# Patient Record
Sex: MALE | Race: WHITE | Employment: OTHER | URBAN - METROPOLITAN AREA
[De-identification: names, ages, dates, MRNs, and addresses within clinical notes are randomized per-mention and may not be internally consistent; named-entity substitution may affect disease eponyms.]

---

## 2017-02-06 ENCOUNTER — OFFICE VISIT (OUTPATIENT)
Dept: FAMILY MEDICINE CLINIC | Age: 67
End: 2017-02-06

## 2017-02-06 VITALS
RESPIRATION RATE: 16 BRPM | HEIGHT: 67 IN | HEART RATE: 72 BPM | WEIGHT: 232.13 LBS | DIASTOLIC BLOOD PRESSURE: 70 MMHG | SYSTOLIC BLOOD PRESSURE: 132 MMHG | BODY MASS INDEX: 36.43 KG/M2

## 2017-02-06 DIAGNOSIS — E78.00 PURE HYPERCHOLESTEROLEMIA: ICD-10-CM

## 2017-02-06 DIAGNOSIS — I25.10 CORONARY ARTERY DISEASE INVOLVING NATIVE CORONARY ARTERY OF NATIVE HEART WITHOUT ANGINA PECTORIS: ICD-10-CM

## 2017-02-06 DIAGNOSIS — M15.9 PRIMARY OSTEOARTHRITIS INVOLVING MULTIPLE JOINTS: Primary | ICD-10-CM

## 2017-02-06 DIAGNOSIS — N40.0 BENIGN NON-NODULAR PROSTATIC HYPERPLASIA WITHOUT LOWER URINARY TRACT SYMPTOMS: ICD-10-CM

## 2017-02-06 PROCEDURE — 99213 OFFICE O/P EST LOW 20 MIN: CPT | Performed by: FAMILY MEDICINE

## 2017-02-06 ASSESSMENT — ENCOUNTER SYMPTOMS
EYE PAIN: 0
SORE THROAT: 0
TROUBLE SWALLOWING: 0
BLOOD IN STOOL: 0
COUGH: 0
BACK PAIN: 0
SHORTNESS OF BREATH: 0
ABDOMINAL PAIN: 0
CHEST TIGHTNESS: 0
NAUSEA: 0
CONSTIPATION: 0

## 2017-02-06 ASSESSMENT — PATIENT HEALTH QUESTIONNAIRE - PHQ9
SUM OF ALL RESPONSES TO PHQ9 QUESTIONS 1 & 2: 0
2. FEELING DOWN, DEPRESSED OR HOPELESS: 0
2. FEELING DOWN, DEPRESSED OR HOPELESS: 0
1. LITTLE INTEREST OR PLEASURE IN DOING THINGS: 0
1. LITTLE INTEREST OR PLEASURE IN DOING THINGS: 0
SUM OF ALL RESPONSES TO PHQ9 QUESTIONS 1 & 2: 0
SUM OF ALL RESPONSES TO PHQ QUESTIONS 1-9: 0
SUM OF ALL RESPONSES TO PHQ QUESTIONS 1-9: 0

## 2017-02-08 LAB
ABSOLUTE BASO #: 0.1 K/UL (ref 0–0.1)
ABSOLUTE EOS #: 0.5 K/UL (ref 0.1–0.4)
ABSOLUTE LYMPH #: 1.8 K/UL (ref 0.8–5.2)
ABSOLUTE MONO #: 0.7 K/UL (ref 0.1–0.9)
ABSOLUTE NEUT #: 4.7 K/UL (ref 1.3–9.1)
ANION GAP SERPL CALCULATED.3IONS-SCNC: 11 MMOL/L
BASOPHILS RELATIVE PERCENT: 0.9 % (ref 0–1)
BUN BLDV-MCNC: 17 MG/DL (ref 10–20)
CALCIUM SERPL-MCNC: 9.2 MG/DL (ref 8.7–10.8)
CHLORIDE BLD-SCNC: 104 MMOL/L (ref 95–111)
CO2: 27 MMOL/L (ref 21–32)
CREAT SERPL-MCNC: 1.1 MG/DL (ref 0.5–1.3)
EGFR AFRICAN AMERICAN: 81
EGFR IF NONAFRICAN AMERICAN: 67
EOSINOPHILS RELATIVE PERCENT: 6.8 % (ref 1–4)
GLUCOSE: 103 MG/DL (ref 70–100)
HCT VFR BLD CALC: 48.1 % (ref 41.4–51)
HEMOGLOBIN: 16.5 G/DL (ref 13.8–17)
LYMPHOCYTE %: 22.7 % (ref 16–48)
MCH RBC QN AUTO: 29.5 PG (ref 27–34)
MCHC RBC AUTO-ENTMCNC: 34.3 G/DL (ref 31–36)
MCV RBC AUTO: 86 FL (ref 80–100)
MONOCYTES # BLD: 8.8 % (ref 1–8)
NEUTROPHILS RELATIVE PERCENT: 60.3 % (ref 45–75)
PDW BLD-RTO: 13.1 % (ref 10.8–14.8)
PLATELETS: 140 K/UL (ref 150–450)
POTASSIUM SERPL-SCNC: 3.8 MMOL/L (ref 3.5–5.4)
RBC: 5.59 M/UL (ref 4–5.5)
SODIUM BLD-SCNC: 138 MMOL/L (ref 134–147)
WBC: 7.8 K/UL (ref 3.7–10.8)

## 2017-02-09 LAB — PSA, ULTRASENSITIVE: 0.33 NG/ML

## 2017-02-13 ENCOUNTER — TELEPHONE (OUTPATIENT)
Dept: FAMILY MEDICINE CLINIC | Age: 67
End: 2017-02-13

## 2017-04-28 ENCOUNTER — TELEPHONE (OUTPATIENT)
Dept: FAMILY MEDICINE CLINIC | Age: 67
End: 2017-04-28

## 2017-04-28 ENCOUNTER — OFFICE VISIT (OUTPATIENT)
Dept: FAMILY MEDICINE CLINIC | Age: 67
End: 2017-04-28

## 2017-04-28 VITALS
BODY MASS INDEX: 35.82 KG/M2 | HEART RATE: 60 BPM | WEIGHT: 228.25 LBS | HEIGHT: 67 IN | SYSTOLIC BLOOD PRESSURE: 126 MMHG | DIASTOLIC BLOOD PRESSURE: 66 MMHG | RESPIRATION RATE: 12 BRPM

## 2017-04-28 DIAGNOSIS — H90.3 HEARING LOSS, SENSORINEURAL, HIGH FREQUENCY, BILATERAL: Primary | ICD-10-CM

## 2017-04-28 DIAGNOSIS — H91.93 PROFOUND BILATERAL HEARING LOSS: Primary | ICD-10-CM

## 2017-04-28 PROCEDURE — 99213 OFFICE O/P EST LOW 20 MIN: CPT | Performed by: FAMILY MEDICINE

## 2017-04-28 ASSESSMENT — ENCOUNTER SYMPTOMS
CHEST TIGHTNESS: 0
BLOOD IN STOOL: 0
CONSTIPATION: 0
SHORTNESS OF BREATH: 0
BACK PAIN: 0
NAUSEA: 0
TROUBLE SWALLOWING: 0
SORE THROAT: 0
EYE PAIN: 0
COUGH: 0
ABDOMINAL PAIN: 0

## 2017-05-30 ENCOUNTER — OFFICE VISIT (OUTPATIENT)
Dept: AUDIOLOGY | Age: 67
End: 2017-05-30

## 2017-05-30 DIAGNOSIS — H90.3 SENSORINEURAL HEARING LOSS, BILATERAL: Primary | ICD-10-CM

## 2017-07-10 ENCOUNTER — CARE COORDINATION (OUTPATIENT)
Dept: FAMILY MEDICINE CLINIC | Age: 67
End: 2017-07-10

## 2017-08-14 ENCOUNTER — HOSPITAL ENCOUNTER (OUTPATIENT)
Age: 67
Discharge: HOME | End: 2017-08-14
Payer: MEDICARE

## 2017-08-14 ENCOUNTER — HOSPITAL ENCOUNTER (OUTPATIENT)
Dept: GENERAL RADIOLOGY | Age: 67
Discharge: HOME | End: 2017-08-14
Payer: MEDICARE

## 2017-08-14 DIAGNOSIS — Z01.818 PRE-OP TESTING: ICD-10-CM

## 2017-08-14 LAB
ANION GAP SERPL CALCULATED.3IONS-SCNC: 14 MEQ/L (ref 8–16)
BUN BLDV-MCNC: 13 MG/DL (ref 7–22)
CHLORIDE BLD-SCNC: 104 MEQ/L (ref 98–111)
CO2: 24 MEQ/L (ref 23–33)
CREAT SERPL-MCNC: 1 MG/DL (ref 0.4–1.2)
EKG ATRIAL RATE: 62 BPM
EKG P AXIS: 40 DEGREES
EKG P-R INTERVAL: 144 MS
EKG Q-T INTERVAL: 438 MS
EKG QRS DURATION: 78 MS
EKG QTC CALCULATION (BAZETT): 444 MS
EKG R AXIS: 60 DEGREES
EKG T AXIS: 2 DEGREES
EKG VENTRICULAR RATE: 62 BPM
GFR SERPL CREATININE-BSD FRML MDRD: 75 ML/MIN/1.73M2
GLUCOSE BLD-MCNC: 105 MG/DL (ref 70–108)
POTASSIUM SERPL-SCNC: 4.3 MEQ/L (ref 3.5–5.2)
SODIUM BLD-SCNC: 142 MEQ/L (ref 135–145)

## 2017-08-14 PROCEDURE — 84520 ASSAY OF UREA NITROGEN: CPT

## 2017-08-14 PROCEDURE — 36415 COLL VENOUS BLD VENIPUNCTURE: CPT

## 2017-08-14 PROCEDURE — 71020 XR CHEST STANDARD TWO VW: CPT

## 2017-08-14 PROCEDURE — 82947 ASSAY GLUCOSE BLOOD QUANT: CPT

## 2017-08-14 PROCEDURE — 80051 ELECTROLYTE PANEL: CPT

## 2017-08-14 PROCEDURE — 93005 ELECTROCARDIOGRAM TRACING: CPT

## 2017-08-14 PROCEDURE — 82565 ASSAY OF CREATININE: CPT

## 2017-08-21 ENCOUNTER — OFFICE VISIT (OUTPATIENT)
Dept: FAMILY MEDICINE CLINIC | Age: 67
End: 2017-08-21

## 2017-08-21 VITALS
WEIGHT: 230.38 LBS | BODY MASS INDEX: 36.16 KG/M2 | SYSTOLIC BLOOD PRESSURE: 130 MMHG | HEIGHT: 67 IN | DIASTOLIC BLOOD PRESSURE: 76 MMHG | HEART RATE: 64 BPM | RESPIRATION RATE: 12 BRPM

## 2017-08-21 DIAGNOSIS — H90.3 SENSORINEURAL HEARING LOSS (SNHL) OF BOTH EARS: ICD-10-CM

## 2017-08-21 DIAGNOSIS — J06.9 VIRAL URI: ICD-10-CM

## 2017-08-21 DIAGNOSIS — I25.10 ARTERIOSCLEROTIC HEART DISEASE (ASHD): Primary | ICD-10-CM

## 2017-08-21 DIAGNOSIS — E78.00 PURE HYPERCHOLESTEROLEMIA: ICD-10-CM

## 2017-08-21 PROBLEM — H93.299 AUDITORY DISCRIMINATION IMPAIRMENT: Status: ACTIVE | Noted: 2017-05-09

## 2017-08-21 PROCEDURE — 99213 OFFICE O/P EST LOW 20 MIN: CPT | Performed by: FAMILY MEDICINE

## 2017-08-21 ASSESSMENT — ENCOUNTER SYMPTOMS
EYE PAIN: 0
ORTHOPNEA: 0
BACK PAIN: 0
TROUBLE SWALLOWING: 0
ABDOMINAL PAIN: 0
BLOOD IN STOOL: 0
CHEST TIGHTNESS: 0
NAUSEA: 0
COUGH: 0
CONSTIPATION: 0
SORE THROAT: 0
SHORTNESS OF BREATH: 0

## 2017-09-13 ENCOUNTER — HOSPITAL ENCOUNTER (OUTPATIENT)
Dept: AUDIOLOGY | Age: 67
Discharge: HOME | End: 2017-09-13

## 2017-09-13 PROCEDURE — 9990000010 HC NO CHARGE VISIT: Performed by: AUDIOLOGIST

## 2017-12-21 ENCOUNTER — HOSPITAL ENCOUNTER (EMERGENCY)
Age: 67
Discharge: HOME | End: 2017-12-21
Payer: MEDICARE

## 2017-12-21 VITALS
WEIGHT: 225 LBS | BODY MASS INDEX: 35.31 KG/M2 | RESPIRATION RATE: 18 BRPM | TEMPERATURE: 99.9 F | HEART RATE: 107 BPM | SYSTOLIC BLOOD PRESSURE: 105 MMHG | OXYGEN SATURATION: 95 % | DIASTOLIC BLOOD PRESSURE: 63 MMHG | HEIGHT: 67 IN

## 2017-12-21 DIAGNOSIS — J10.1 INFLUENZA A: Primary | ICD-10-CM

## 2017-12-21 LAB
FLU A ANTIGEN: POSITIVE
FLU B ANTIGEN: NEGATIVE

## 2017-12-21 PROCEDURE — 99213 OFFICE O/P EST LOW 20 MIN: CPT

## 2017-12-21 PROCEDURE — 87804 INFLUENZA ASSAY W/OPTIC: CPT

## 2017-12-21 PROCEDURE — 99213 OFFICE O/P EST LOW 20 MIN: CPT | Performed by: NURSE PRACTITIONER

## 2017-12-21 RX ORDER — OSELTAMIVIR PHOSPHATE 75 MG/1
75 CAPSULE ORAL 2 TIMES DAILY
Qty: 10 CAPSULE | Refills: 0 | Status: SHIPPED | OUTPATIENT
Start: 2017-12-21 | End: 2017-12-26

## 2017-12-21 ASSESSMENT — ENCOUNTER SYMPTOMS
EYE DISCHARGE: 0
NAUSEA: 0
BACK PAIN: 0
VOMITING: 0
ABDOMINAL PAIN: 0
CONSTIPATION: 0
RHINORRHEA: 0
COUGH: 1
WHEEZING: 0
SHORTNESS OF BREATH: 0
STRIDOR: 0
COLOR CHANGE: 0
EYE PAIN: 0
SORE THROAT: 0
DIARRHEA: 0

## 2017-12-21 NOTE — ED PROVIDER NOTES
[id number]  Urgent Care Encounter      CHIEF COMPLAINT       Chief Complaint   Patient presents with    Fever     started today    Sinusitis     dry cough, light headed when first getting up       Nurses Notes reviewed and I agree except as noted in the HPI. HISTORY OF PRESENT ILLNESS   Jamie Lua is a 79 y.o. male who presents With high fever and dry cough starting today. Denies nausea vomiting diarrhea, neck pain or stiffness, sore throat or otalgia. Patient went to his PCP and he was referred here for a flu test.    REVIEW OF SYSTEMS     Review of Systems   Constitutional: Positive for fever. Negative for activity change, appetite change, chills and fatigue. HENT: Positive for congestion. Negative for ear pain, rhinorrhea and sore throat. Eyes: Negative for pain and discharge. Respiratory: Positive for cough. Negative for shortness of breath, wheezing and stridor. Cardiovascular: Negative for chest pain. Gastrointestinal: Negative for abdominal pain, constipation, diarrhea, nausea and vomiting. Genitourinary: Negative for dysuria, flank pain and frequency. Musculoskeletal: Negative for arthralgias, back pain, myalgias and neck pain. Skin: Negative for color change and rash. Allergic/Immunologic: Negative for immunocompromised state. Neurological: Negative for dizziness, weakness and headaches. Psychiatric/Behavioral: Negative. PAST MEDICAL HISTORY         Diagnosis Date    Cancer [facility])     skin    Colon polyps 2013      sheik    due  2018    Coronary artery disease 2016    stent  to lad  and  70% circ    H/O colonoscopy 2003    negative    Hyperlipidemia     Kidney stone 1994    Osteoarthritis        SURGICAL HISTORY     Patient  has a past surgical history that includes Knee arthroscopy (1998; 4/04); Cholecystectomy, laparoscopic (3/11); Rotator cuff repair (2004); Total knee arthroplasty;  Carpal tunnel release; Skin cancer excision; Colonoscopy ( 2013); joint replacement; skin biopsy; Coronary angioplasty with stent (2016  may); and Cochlear implant (Right, 08/2017). CURRENT MEDICATIONS       Previous Medications    ASPIRIN 81 MG TABLET    Take 81 mg by mouth daily    ATORVASTATIN (LIPITOR) 40 MG TABLET    Take 40 mg by mouth nightly     ISOSORBIDE MONONITRATE (IMDUR) 30 MG CR TABLET    Take 30 mg by mouth daily    METOPROLOL (LOPRESSOR) 25 MG TABLET    Take 1 tablet by mouth 2 times daily    NITROGLYCERIN (NITROSTAT) 0.4 MG SL TABLET    Place 1 tablet under the tongue every 5 minutes as needed for Chest pain    TICAGRELOR (BRILINTA) 60 MG TABS TABLET    Take 60 mg by mouth 2 times daily       ALLERGIES     Patient is has No Known Allergies. FAMILY HISTORY     Patient's family history includes Heart Disease in his father; High Blood Pressure in his father. SOCIAL HISTORY     Patient  reports that he has never smoked. He has never used smokeless tobacco. He reports that he drinks alcohol. He reports that he does not use drugs. PHYSICAL EXAM     ED TRIAGE VITALS  BP: 105/63, Temp: 99.9 °F (37.7 °C), Pulse: 107, Resp: 18, SpO2: 95 %  Physical Exam   Constitutional: He appears well-developed and well-nourished. He is cooperative. He appears ill. No distress. HENT:   Right Ear: Hearing, tympanic membrane, external ear and ear canal normal.   Left Ear: Hearing, tympanic membrane, external ear and ear canal normal.   Nose: Nose normal. No mucosal edema or rhinorrhea. Right sinus exhibits no maxillary sinus tenderness. Left sinus exhibits no maxillary sinus tenderness. Mouth/Throat: Uvula is midline and mucous membranes are normal. No posterior oropharyngeal edema or posterior oropharyngeal erythema. Eyes: Conjunctivae and EOM are normal. Pupils are equal, round, and reactive to light. Neck: Normal range of motion and full passive range of motion without pain. Neck supple. Cardiovascular: Regular rhythm.     Pulmonary/Chest: Effort

## 2018-02-13 ENCOUNTER — OFFICE VISIT (OUTPATIENT)
Dept: FAMILY MEDICINE CLINIC | Age: 68
End: 2018-02-13

## 2018-02-13 VITALS
HEIGHT: 67 IN | DIASTOLIC BLOOD PRESSURE: 76 MMHG | WEIGHT: 230.25 LBS | RESPIRATION RATE: 14 BRPM | SYSTOLIC BLOOD PRESSURE: 130 MMHG | HEART RATE: 64 BPM | BODY MASS INDEX: 36.14 KG/M2

## 2018-02-13 DIAGNOSIS — I25.10 CORONARY ARTERY DISEASE INVOLVING NATIVE CORONARY ARTERY OF NATIVE HEART WITHOUT ANGINA PECTORIS: Primary | ICD-10-CM

## 2018-02-13 DIAGNOSIS — H90.3 SENSORINEURAL HEARING LOSS (SNHL) OF BOTH EARS: ICD-10-CM

## 2018-02-13 DIAGNOSIS — E78.00 PURE HYPERCHOLESTEROLEMIA: ICD-10-CM

## 2018-02-13 DIAGNOSIS — M15.9 PRIMARY OSTEOARTHRITIS INVOLVING MULTIPLE JOINTS: ICD-10-CM

## 2018-02-13 DIAGNOSIS — K63.5 POLYP OF COLON, UNSPECIFIED PART OF COLON, UNSPECIFIED TYPE: ICD-10-CM

## 2018-02-13 PROCEDURE — 99213 OFFICE O/P EST LOW 20 MIN: CPT | Performed by: FAMILY MEDICINE

## 2018-02-13 ASSESSMENT — ENCOUNTER SYMPTOMS
ABDOMINAL PAIN: 0
BACK PAIN: 0
SORE THROAT: 0
TROUBLE SWALLOWING: 0
NAUSEA: 0
BLOOD IN STOOL: 0
SHORTNESS OF BREATH: 0
CHEST TIGHTNESS: 0
COUGH: 0
EYE PAIN: 0
CONSTIPATION: 0

## 2018-02-13 ASSESSMENT — PATIENT HEALTH QUESTIONNAIRE - PHQ9
SUM OF ALL RESPONSES TO PHQ QUESTIONS 1-9: 0
2. FEELING DOWN, DEPRESSED OR HOPELESS: 0
SUM OF ALL RESPONSES TO PHQ9 QUESTIONS 1 & 2: 0
1. LITTLE INTEREST OR PLEASURE IN DOING THINGS: 0

## 2018-09-24 ENCOUNTER — TELEPHONE (OUTPATIENT)
Dept: FAMILY MEDICINE CLINIC | Age: 68
End: 2018-09-24

## 2018-09-24 ENCOUNTER — HOSPITAL ENCOUNTER (EMERGENCY)
Age: 68
Discharge: HOME | End: 2018-09-24
Attending: EMERGENCY MEDICINE
Payer: MEDICARE

## 2018-09-24 VITALS
WEIGHT: 230 LBS | SYSTOLIC BLOOD PRESSURE: 128 MMHG | RESPIRATION RATE: 16 BRPM | DIASTOLIC BLOOD PRESSURE: 95 MMHG | OXYGEN SATURATION: 93 % | HEIGHT: 67 IN | TEMPERATURE: 98.4 F | BODY MASS INDEX: 36.1 KG/M2 | HEART RATE: 102 BPM

## 2018-09-24 DIAGNOSIS — I49.9 SINUS NODE ARRHYTHMIA: Primary | ICD-10-CM

## 2018-09-24 DIAGNOSIS — Z95.5 HISTORY OF PLACEMENT OF STENT IN LAD CORONARY ARTERY: ICD-10-CM

## 2018-09-24 LAB
ALBUMIN SERPL-MCNC: 4.1 G/DL (ref 3.5–5.1)
ALP BLD-CCNC: 77 U/L (ref 38–126)
ALT SERPL-CCNC: 25 U/L (ref 11–66)
ANION GAP SERPL CALCULATED.3IONS-SCNC: 10 MEQ/L (ref 8–16)
AST SERPL-CCNC: 33 U/L (ref 5–40)
BASOPHILS # BLD: 0.7 %
BASOPHILS ABSOLUTE: 0.1 THOU/MM3 (ref 0–0.1)
BILIRUB SERPL-MCNC: 2.3 MG/DL (ref 0.3–1.2)
BILIRUBIN DIRECT: 0.4 MG/DL (ref 0–0.3)
BUN BLDV-MCNC: 21 MG/DL (ref 7–22)
CALCIUM SERPL-MCNC: 9.1 MG/DL (ref 8.5–10.5)
CHLORIDE BLD-SCNC: 106 MEQ/L (ref 98–111)
CHOLESTEROL, TOTAL: 122 MG/DL (ref 100–199)
CO2: 25 MEQ/L (ref 23–33)
CREAT SERPL-MCNC: 1.1 MG/DL (ref 0.4–1.2)
EKG ATRIAL RATE: 83 BPM
EKG ATRIAL RATE: 97 BPM
EKG P AXIS: 45 DEGREES
EKG P AXIS: 47 DEGREES
EKG P-R INTERVAL: 122 MS
EKG P-R INTERVAL: 150 MS
EKG Q-T INTERVAL: 358 MS
EKG Q-T INTERVAL: 360 MS
EKG QRS DURATION: 72 MS
EKG QRS DURATION: 74 MS
EKG QTC CALCULATION (BAZETT): 423 MS
EKG QTC CALCULATION (BAZETT): 454 MS
EKG R AXIS: 46 DEGREES
EKG R AXIS: 53 DEGREES
EKG T AXIS: 18 DEGREES
EKG T AXIS: 24 DEGREES
EKG VENTRICULAR RATE: 83 BPM
EKG VENTRICULAR RATE: 97 BPM
EOSINOPHIL # BLD: 4.1 %
EOSINOPHILS ABSOLUTE: 0.3 THOU/MM3 (ref 0–0.4)
ERYTHROCYTE [DISTWIDTH] IN BLOOD BY AUTOMATED COUNT: 12.7 % (ref 11.5–14.5)
ERYTHROCYTE [DISTWIDTH] IN BLOOD BY AUTOMATED COUNT: 40.3 FL (ref 35–45)
GFR SERPL CREATININE-BSD FRML MDRD: 67 ML/MIN/1.73M2
GLUCOSE BLD-MCNC: 119 MG/DL (ref 70–108)
HCT VFR BLD CALC: 50.1 % (ref 42–52)
HDLC SERPL-MCNC: 53 MG/DL
HEMOGLOBIN: 17.9 GM/DL (ref 14–18)
IMMATURE GRANS (ABS): 0.04 THOU/MM3 (ref 0–0.07)
IMMATURE GRANULOCYTES: 0.5 %
LDL CHOLESTEROL CALCULATED: 30 MG/DL
LYMPHOCYTES # BLD: 21.2 %
LYMPHOCYTES ABSOLUTE: 1.6 THOU/MM3 (ref 1–4.8)
MAGNESIUM: 2.2 MG/DL (ref 1.6–2.4)
MCH RBC QN AUTO: 31.3 PG (ref 26–33)
MCHC RBC AUTO-ENTMCNC: 35.7 GM/DL (ref 32.2–35.5)
MCV RBC AUTO: 87.6 FL (ref 80–94)
MONOCYTES # BLD: 7.4 %
MONOCYTES ABSOLUTE: 0.6 THOU/MM3 (ref 0.4–1.3)
NUCLEATED RED BLOOD CELLS: 0 /100 WBC
OSMOLALITY CALCULATION: 285.4 MOSMOL/KG (ref 275–300)
PLATELET # BLD: 149 THOU/MM3 (ref 130–400)
PMV BLD AUTO: 10.7 FL (ref 9.4–12.4)
POTASSIUM SERPL-SCNC: 4.8 MEQ/L (ref 3.5–5.2)
RBC # BLD: 5.72 MILL/MM3 (ref 4.7–6.1)
SEG NEUTROPHILS: 66.1 %
SEGMENTED NEUTROPHILS ABSOLUTE COUNT: 5 THOU/MM3 (ref 1.8–7.7)
SODIUM BLD-SCNC: 141 MEQ/L (ref 135–145)
TOTAL PROTEIN: 7 G/DL (ref 6.1–8)
TRIGL SERPL-MCNC: 193 MG/DL (ref 0–199)
TROPONIN T: < 0.01 NG/ML
TSH SERPL DL<=0.05 MIU/L-ACNC: 2.75 UIU/ML (ref 0.4–4.2)
WBC # BLD: 7.5 THOU/MM3 (ref 4.8–10.8)

## 2018-09-24 PROCEDURE — 93010 ELECTROCARDIOGRAM REPORT: CPT | Performed by: NUCLEAR MEDICINE

## 2018-09-24 PROCEDURE — 85025 COMPLETE CBC W/AUTO DIFF WBC: CPT

## 2018-09-24 PROCEDURE — 93226 XTRNL ECG REC<48 HR SCAN A/R: CPT

## 2018-09-24 PROCEDURE — 36415 COLL VENOUS BLD VENIPUNCTURE: CPT

## 2018-09-24 PROCEDURE — 80061 LIPID PANEL: CPT

## 2018-09-24 PROCEDURE — 84484 ASSAY OF TROPONIN QUANT: CPT

## 2018-09-24 PROCEDURE — 84443 ASSAY THYROID STIM HORMONE: CPT

## 2018-09-24 PROCEDURE — 93225 XTRNL ECG REC<48 HRS REC: CPT

## 2018-09-24 PROCEDURE — 83735 ASSAY OF MAGNESIUM: CPT

## 2018-09-24 PROCEDURE — 80053 COMPREHEN METABOLIC PANEL: CPT

## 2018-09-24 PROCEDURE — 99284 EMERGENCY DEPT VISIT MOD MDM: CPT

## 2018-09-24 PROCEDURE — 82248 BILIRUBIN DIRECT: CPT

## 2018-09-24 PROCEDURE — 93005 ELECTROCARDIOGRAM TRACING: CPT | Performed by: EMERGENCY MEDICINE

## 2018-09-24 ASSESSMENT — ENCOUNTER SYMPTOMS
BACK PAIN: 0
NAUSEA: 0
EYE DISCHARGE: 0
EYE REDNESS: 0
SHORTNESS OF BREATH: 0
SORE THROAT: 0
RHINORRHEA: 0
WHEEZING: 0
ABDOMINAL PAIN: 0
DIARRHEA: 0
COUGH: 0
VOMITING: 0

## 2018-09-24 NOTE — ED PROVIDER NOTES
encounter: 230 lb (104.3 kg). Physical Exam   Constitutional: He is oriented to person, place, and time. He appears well-developed and well-nourished. Non-toxic appearance. HENT:   Head: Normocephalic and atraumatic. Right Ear: Tympanic membrane and external ear normal.   Left Ear: Tympanic membrane and external ear normal.   Nose: Nose normal.   Mouth/Throat: Oropharynx is clear and moist and mucous membranes are normal. No oropharyngeal exudate, posterior oropharyngeal edema or posterior oropharyngeal erythema. Eyes: Conjunctivae and EOM are normal.   Neck: Normal range of motion. Neck supple. No JVD present. Cardiovascular: Regular rhythm, normal heart sounds, intact distal pulses and normal pulses. Exam reveals no gallop and no friction rub. No murmur heard. The patient's pulse went into and out of normal sinus rhythm. Pulmonary/Chest: Effort normal and breath sounds normal. No respiratory distress. He has no decreased breath sounds. He has no wheezes. He has no rhonchi. He has no rales. Abdominal: Soft. Bowel sounds are normal. He exhibits no distension. There is no tenderness. There is no rebound, no guarding and no CVA tenderness. Musculoskeletal: Normal range of motion. He exhibits no edema. Neurological: He is alert and oriented to person, place, and time. He exhibits normal muscle tone. Coordination normal.   Skin: Skin is warm and dry. No rash noted. He is not diaphoretic. Nursing note and vitals reviewed.       MEDICAL DECISION MAKING    DIFFERENTIAL DIAGNOSIS:  Atrial fibrillation, atrial flutter, cardiac dysrhythmia, metabolic abnormality, electrolyte abnormality, thyroid dysfunction      DIAGNOSTIC RESULTS    EKG   Interpreted by Ortega Arriola MD   Rhythm: sinus rhythm with marked sinus arrhythmia, No STEMI   Rate: normal  Axis: normal  Ectopy: none  Conduction: normal  ST Segments: no acute change  T Waves: no acute change  Q Waves: none    Clinical Impression: sinus rhythm with marked sinus arrhythmia, No STEMI    Repeat EKG  Interpreted by Finn Zazueta MD    Rhythm: sinus rhythm with marked sinus arrhythmia, No STEMI   Rate: normal  Axis: normal  Ectopy: none  Conduction: normal  ST Segments: no acute change  T Waves: no acute change  Q Waves: none    Clinical Impression: sinus rhythm with marked sinus arrhythmia, No STEMI    LABS:   Labs Reviewed   CBC WITH AUTO DIFFERENTIAL - Abnormal; Notable for the following:        Result Value    MCHC 35.7 (*)     All other components within normal limits   BASIC METABOLIC PANEL - Abnormal; Notable for the following:     Glucose 119 (*)     All other components within normal limits   HEPATIC FUNCTION PANEL - Abnormal; Notable for the following: Total Bilirubin 2.3 (*)     Bilirubin, Direct 0.4 (*)     All other components within normal limits   GLOMERULAR FILTRATION RATE, ESTIMATED - Abnormal; Notable for the following:     Est, Glom Filt Rate 67 (*)     All other components within normal limits   TROPONIN   MAGNESIUM   TSH WITH REFLEX   LIPID PANEL   ANION GAP   OSMOLALITY     All other unresulted laboratory test above are normal:    Vitals:    Vitals:    09/24/18 1338 09/24/18 1416 09/24/18 1517   BP: (!) 155/78 128/81 (!) 128/95   Pulse: 97 100 102   Resp: 14 16 16   Temp: 98.4 °F (36.9 °C)     TempSrc: Oral     SpO2: 95% 94% 93%   Weight: 230 lb (104.3 kg)     Height: 5' 7\" (1.702 m)         EMERGENCY DEPARTMENT COURSE:    Medications - No data to display    The pt was seen and evaluated by me. Within the department, I observed the pt's vital signs to be within acceptable range. Laboratory and Radiological studies were performed, results were reviewed with the patient. I observed the pt's condition to remain stable during the duration of their stay. I decided to place the patient on a holter monitor for the next 48 hours. I explained my proposed course of treatment to the pt, and they were amenable to my decision.  They were discharged home, and they will return to the ED if their symptoms become more severe in nature, or otherwise change. CRITICAL CARE:   None. CONSULTS:  None    PROCEDURES:  None. FINAL IMPRESSION       1. Sinus node arrhythmia    2. History of placement of stent in LAD coronary artery          DISPOSITION/PLAN  PATIENT REFERRED TO:  MD Valentin Gongora  [address] [facility]    In 1 week      Esvin Fermin MD  [address]  [phone]    In 5 days      DISCHARGE MEDICATIONS:  Discharge Medication List as of 9/24/2018  3:50 PM          (Please note that portions of this note were completed with a voice recognition program.  Efforts were made to edit the dictations but occasionally words are mis-transcribed.)      Scribe:  Ting Castro 10/01/18 3:09 PM Scribing for and in the presence of Micaela Aguirre M.D. Signed by: Philippe Killian, 10/01/18 10:50 AM    Provider:  I personally performed the services described in the documentation, reviewed and edited the documentation which was dictated to the scribe in my presence, and it accurately records my words and actions.      10/01/18 10:50 AM      Oliver Tucker MD      Emergency room physician            Oliver Tucker MD  10/01/18 [id number]

## 2018-10-17 ENCOUNTER — TELEPHONE (OUTPATIENT)
Dept: FAMILY MEDICINE CLINIC | Age: 68
End: 2018-10-17

## 2018-10-25 ENCOUNTER — TELEPHONE (OUTPATIENT)
Dept: FAMILY MEDICINE CLINIC | Age: 68
End: 2018-10-25

## 2018-10-25 NOTE — TELEPHONE ENCOUNTER
----- Message from Nupur De La Rosa MD sent at 10/25/2018  5:39 AM EDT -----  Bilirubin up as in past and normal liver  functioins and  feel oumarbert .   Need his old chart pulled

## 2018-10-26 NOTE — TELEPHONE ENCOUNTER
I called Dr Tomas Webb ofc and they said that at the October 2, 2018 visit Dr Ju Oakes discontinued the Imdur and started Cartia 120 mg  I po qd. He was also to continue his ASA and Brilinta.

## 2018-10-29 RX ORDER — DILTIAZEM HYDROCHLORIDE 120 MG/1
120 CAPSULE, COATED, EXTENDED RELEASE ORAL DAILY
Qty: 30 CAPSULE | Refills: 3
Start: 2018-10-29 | End: 2022-09-28 | Stop reason: SDUPTHER

## 2018-12-07 NOTE — PROGRESS NOTES
[facility]                                                                                      Sleep Medicine Initial Consultation    [address] [place]                                                Chief complaint: [address] [place] is a 76 y. o.oldmale came for further evaluation regarding his ?sleep apnea  with referral from Dr. Georgiana Mckee.    Chief Complaint: Daniel Peoples is here for a sleep consult referred by Anthony Nielsen:    Sleep/Wake schedule:  Usual time to go to bed during the work/regular day of week: 10:00 to 11:00 PM.  Usual time to wake up during the work//regular day of week: 7:00 to 8:00 AM.  Over the weekends his sleep schedule: [x] Remain same. He usually falls a sleep in less than: 10 minutes. He takes naps: No.    Sleep Hygiene:  Is the temperature and evironment in his bed room is acceptable to him: Yes. He watches Television in his bed room: No.  He read books, study, pay bills etc in the bed: No.  Frequency He wake up during night/sleep: 1 to 2  Majority of nocturnal awakenings are for urination: No.    Difficulty in falling back to sleep after nocturnal awakenings: No  .  Do you drink coffee:   [x] Yes. 1 cup per week. Do you drink caffeinated beverages i.e sodas: [x] Yes. 1 can/s per week. Do you drink tea:  [x] Yes. 2 to 3 cup/s/glasse/s of decaffeinated tea per day. Do you drink alcoholic beverages:   [x] Yes. 1 drink per day. History of recreational drug use: No.     Sleep apnea symptoms:  Noticed to have loud snoring:Yes by his wife only when he lays down on his back and alcoholic drink. Witnessed apneas during sleep noticed: No.   History of choking and gasping sensation at night time: No.  History of headaches in the morning:No.  History of dry mouth in the morning: Yes.   History of palpitations during night time/nocturnal awakenings: No.  History of sweating during night time/nocturnal 08/2017    at [facility]  dr Sole Aragon    COLONOSCOPY   2013      sheik   colon polyps    CORONARY ANGIOPLASTY WITH STENT PLACEMENT  2016  may    stent  Lda  steph    JOINT REPLACEMENT      bilateral knees    KNEE ARTHROSCOPY  1998; 4/04    left:  Right 9/04    ROTATOR CUFF REPAIR  2004    right/ left     SKIN BIOPSY      SKIN CANCER EXCISION      left AC    TOTAL KNEE ARTHROPLASTY      left 10/06; right 6/08       No Known Allergies    Current Outpatient Prescriptions   Medication Sig Dispense Refill    diltiazem (CARDIZEM CD) 120 MG extended release capsule Take 1 capsule by mouth daily 30 capsule 3    ticagrelor (BRILINTA) 60 MG TABS tablet Take 60 mg by mouth 2 times daily      aspirin 81 MG tablet Take 81 mg by mouth daily      atorvastatin (LIPITOR) 40 MG tablet Take 40 mg by mouth nightly       nitroGLYCERIN (NITROSTAT) 0.4 MG SL tablet Place 1 tablet under the tongue every 5 minutes as needed for Chest pain 25 tablet 3    metoprolol (LOPRESSOR) 25 MG tablet Take 1 tablet by mouth 2 times daily 60 tablet 3     No current facility-administered medications for this visit. Family History   Problem Relation Age of Onset    Heart Disease Father     High Blood Pressure Father         Review of Systems:   General/Constitutional: No recent loss of weight or appetite changes. No fever or chills. HENT: Decreased hearing sensation. S/p Cochlear implant on right side. Eyes: Negative. Upper respiratory tract: No nasal stuffiness or post nasal drip. Lower respiratory tract/ lungs: No cough or sputum production. No hemoptysis. Cardiovascular: No palpitations or chest pain. Gastrointestinal: No nausea or vomiting. Neurological: No focal neurologiacal weakness. Extremities: No edema. Musculoskeletal: No complaints. Genitourinary: No complaints. Hematological: Negative. Psychiatric/Behavioral: Negative. Skin: No itching.     /73   Pulse 71   Ht 5' 7\" (1.702 m)   Wt 232 lb 3.2 oz (105.3 MD Cuco.  -Hyperlipidemia      Recommendations/Plan:  -Please obtain latest Echocardiogram report from Dr. Remigio Simons office for review.  -Will schedule patient for polysomnogram in the sleep lab. -I had a discussion with patient regarding avialable treatment options for his sleep disorder breathing including but not limited to CPAP titration in the sleep lab Vs.Dental appliance placement with referral to a local dentist Vs other available surgical options including Uvulopalatopharyngoplasty, maxillomandibular ostomy and tracheostomy as last option. At the end of discussion, he is not decided on his   treatment if he found to have obstructive sleep apnea at this time.  -We will see Jose Antonio Byrne back in 1week after the sleep study to go over the sleep study results and further management options.  -He was educated to practice good sleep hygiene practices. He  was provided with a good sleep hygiene hand out.  -Angel Deal was advised to make earlier appointment with my clinic if he develops any worsening of sleep symptoms. He verbalizes understanding.  -He was advised to loose weight by controlling diet and doing exercise once cleared by his cardiologist Dr. Shellie Hoffman was educated about my impression and plan. He verbalizes understanding.

## 2018-12-10 ENCOUNTER — HOSPITAL ENCOUNTER (OUTPATIENT)
Dept: AUDIOLOGY | Age: 68
Discharge: HOME | End: 2018-12-10

## 2018-12-10 PROCEDURE — 9990000010 HC NO CHARGE VISIT: Performed by: AUDIOLOGIST

## 2018-12-10 NOTE — PROGRESS NOTES
Patient wants his left [facility] hearing aid cleaned and checked. Replaced wax filter, hearing aid is working fine. Patient has a cochlear implant in his right ear which was implanted through [address] [place] [address]. He is doing well with it. No other problems.

## 2018-12-12 ENCOUNTER — INITIAL CONSULT (OUTPATIENT)
Dept: PULMONOLOGY | Age: 68
End: 2018-12-12
Payer: MEDICARE

## 2018-12-12 VITALS
HEART RATE: 71 BPM | SYSTOLIC BLOOD PRESSURE: 104 MMHG | HEIGHT: 67 IN | WEIGHT: 232.2 LBS | OXYGEN SATURATION: 96 % | DIASTOLIC BLOOD PRESSURE: 73 MMHG | BODY MASS INDEX: 36.44 KG/M2

## 2018-12-12 DIAGNOSIS — G47.30 SLEEP APNEA, UNSPECIFIED TYPE: Primary | ICD-10-CM

## 2018-12-12 DIAGNOSIS — R06.83 SNORING: ICD-10-CM

## 2018-12-12 DIAGNOSIS — I48.0 PAROXYSMAL A-FIB (HCC): ICD-10-CM

## 2018-12-12 DIAGNOSIS — I25.10 CORONARY ARTERY DISEASE INVOLVING NATIVE CORONARY ARTERY OF NATIVE HEART WITHOUT ANGINA PECTORIS: ICD-10-CM

## 2018-12-12 PROCEDURE — 99203 OFFICE O/P NEW LOW 30 MIN: CPT | Performed by: INTERNAL MEDICINE

## 2018-12-31 ENCOUNTER — HOSPITAL ENCOUNTER (EMERGENCY)
Dept: GENERAL RADIOLOGY | Age: 68
Discharge: HOME | End: 2018-12-31
Payer: MEDICARE

## 2018-12-31 ENCOUNTER — HOSPITAL ENCOUNTER (EMERGENCY)
Age: 68
Discharge: HOME | End: 2018-12-31
Payer: MEDICARE

## 2018-12-31 VITALS
WEIGHT: 225 LBS | OXYGEN SATURATION: 95 % | HEART RATE: 70 BPM | TEMPERATURE: 98 F | BODY MASS INDEX: 36.16 KG/M2 | HEIGHT: 66 IN | SYSTOLIC BLOOD PRESSURE: 135 MMHG | RESPIRATION RATE: 16 BRPM | DIASTOLIC BLOOD PRESSURE: 82 MMHG

## 2018-12-31 DIAGNOSIS — J20.8 ACUTE VIRAL BRONCHITIS: Primary | ICD-10-CM

## 2018-12-31 PROCEDURE — 99214 OFFICE O/P EST MOD 30 MIN: CPT | Performed by: NURSE PRACTITIONER

## 2018-12-31 PROCEDURE — 99213 OFFICE O/P EST LOW 20 MIN: CPT

## 2018-12-31 PROCEDURE — 71046 X-RAY EXAM CHEST 2 VIEWS: CPT

## 2018-12-31 RX ORDER — PREDNISONE 20 MG/1
20 TABLET ORAL DAILY
Qty: 5 TABLET | Refills: 0 | Status: SHIPPED | OUTPATIENT
Start: 2018-12-31 | End: 2019-01-05

## 2019-01-23 ENCOUNTER — HOSPITAL ENCOUNTER (OUTPATIENT)
Dept: SLEEP CENTER | Age: 69
Discharge: HOME | End: 2019-01-25
Payer: MEDICARE

## 2019-01-23 DIAGNOSIS — R06.83 SNORING: ICD-10-CM

## 2019-01-23 DIAGNOSIS — I25.10 CORONARY ARTERY DISEASE INVOLVING NATIVE CORONARY ARTERY OF NATIVE HEART WITHOUT ANGINA PECTORIS: ICD-10-CM

## 2019-01-23 DIAGNOSIS — G47.30 SLEEP APNEA, UNSPECIFIED TYPE: ICD-10-CM

## 2019-01-23 DIAGNOSIS — I48.0 PAROXYSMAL A-FIB (HCC): ICD-10-CM

## 2019-01-23 PROCEDURE — 95810 POLYSOM 6/> YRS 4/> PARAM: CPT

## 2019-01-24 LAB — STATUS: NORMAL

## 2019-02-14 ENCOUNTER — OFFICE VISIT (OUTPATIENT)
Dept: PULMONOLOGY | Age: 69
End: 2019-02-14
Payer: MEDICARE

## 2019-02-14 VITALS
OXYGEN SATURATION: 98 % | SYSTOLIC BLOOD PRESSURE: 112 MMHG | HEIGHT: 66 IN | BODY MASS INDEX: 37.19 KG/M2 | WEIGHT: 231.4 LBS | HEART RATE: 74 BPM | DIASTOLIC BLOOD PRESSURE: 80 MMHG

## 2019-02-14 DIAGNOSIS — G47.33 OSA (OBSTRUCTIVE SLEEP APNEA): Primary | ICD-10-CM

## 2019-02-14 DIAGNOSIS — I25.10 CORONARY ARTERY DISEASE INVOLVING NATIVE CORONARY ARTERY OF NATIVE HEART WITHOUT ANGINA PECTORIS: ICD-10-CM

## 2019-02-14 DIAGNOSIS — I48.0 PAF (PAROXYSMAL ATRIAL FIBRILLATION) (HCC): ICD-10-CM

## 2019-02-14 PROCEDURE — 99214 OFFICE O/P EST MOD 30 MIN: CPT | Performed by: NURSE PRACTITIONER

## 2019-03-26 ENCOUNTER — HOSPITAL ENCOUNTER (OUTPATIENT)
Dept: SLEEP CENTER | Age: 69
Discharge: HOME | End: 2019-03-28
Payer: MEDICARE

## 2019-03-26 DIAGNOSIS — G47.33 OSA (OBSTRUCTIVE SLEEP APNEA): ICD-10-CM

## 2019-03-26 PROCEDURE — 95811 POLYSOM 6/>YRS CPAP 4/> PARM: CPT

## 2019-03-27 DIAGNOSIS — Z99.89 OSA ON CPAP: Primary | ICD-10-CM

## 2019-03-27 DIAGNOSIS — G47.33 OSA ON CPAP: Primary | ICD-10-CM

## 2019-03-27 LAB — STATUS: NORMAL

## 2019-03-28 ENCOUNTER — TELEPHONE (OUTPATIENT)
Dept: SLEEP CENTER | Age: 69
End: 2019-03-28

## 2019-06-11 ENCOUNTER — OFFICE VISIT (OUTPATIENT)
Dept: PULMONOLOGY | Age: 69
End: 2019-06-11
Payer: MEDICARE

## 2019-06-11 VITALS
DIASTOLIC BLOOD PRESSURE: 78 MMHG | WEIGHT: 225.6 LBS | OXYGEN SATURATION: 95 % | SYSTOLIC BLOOD PRESSURE: 122 MMHG | HEART RATE: 82 BPM | HEIGHT: 67 IN | BODY MASS INDEX: 35.41 KG/M2

## 2019-06-11 DIAGNOSIS — Z99.89 OSA ON CPAP: Primary | ICD-10-CM

## 2019-06-11 DIAGNOSIS — I48.0 PAROXYSMAL ATRIAL FIBRILLATION (HCC): ICD-10-CM

## 2019-06-11 DIAGNOSIS — G47.33 OSA ON CPAP: Primary | ICD-10-CM

## 2019-06-11 PROCEDURE — 99213 OFFICE O/P EST LOW 20 MIN: CPT | Performed by: NURSE PRACTITIONER

## 2019-06-11 NOTE — PROGRESS NOTES
[facility]         [id number]  6/11/2019   Chief Complaint   Patient presents with    Sleep Apnea     4-6 week f/u with download         Pt of Dr. Ian Kennedy    PAP Download:   Stephenie Enter or initial AHI: 11.6     Date of initial study: 1/23/19  Weight of initial study: 232   [x] Compliant  100%   []  Noncompliant 0%     PAP Type cpapLevel  8   Avg Hrs/Day [id number]  AHI: 4.8   Recorded compliance dates, 5/10/19  to 6/8/19   Machine/Mfg: Respironics Interface: nasal     Provider:  [][facility]  [][employer]  [x][employer]  [][facility]         [][facility] []Other:     Neck Size:18  Mallampati 3  ESS: 6  SAQLI:84     Here is a scan of the most recent download:                  Presentation:   Anneliese Oscar presents for sleep medicine follow up for obstructive sleep apnea. Since the last visit, Anenliese Oscar has underwent CPAP titration study and started PAP therapy and is doing well. He does note benefit with treatment. No recurrence of Afib. Does have irritation at bridge of his nose from nasal mask. Is using home remedies including cotton ball with tape. Has not contacted [employer]. Equipment issues: The pressure is  acceptable, the mask is acceptable and he  is  using the humidity. Sleep issues:  Do you feel better? Yes  More rested? Yes   Better concentration? yes    Progress History:   Since last visit any new medical issues? No  New ER or hospitlal visits? No  Any new or changes in medicines? No  Any new sleep medicines?  No      Past Medical History:   Diagnosis Date    Cancer ([facility] [address]     skin    Colon polyps 2013      sheik    due  2018    Coronary artery disease 2016    stent  to lad  and  70% circ    H/O colonoscopy 2003    negative    Hyperlipidemia     Kidney stone 1994    Loss of hearing     FABY (obstructive sleep apnea)     Osteoarthritis     PAF (paroxysmal atrial fibrillation) (HCC)        Past Surgical History:   Procedure Laterality Date    CARPAL TUNNEL RELEASE      left     CHOLECYSTECTOMY, Skin: No itching. Physical Exam:    BMI:  Body mass index is 35.33 kg/m². Wt Readings from Last 3 Encounters:   06/11/19 225 lb 9.6 oz (102.3 kg)   02/14/19 231 lb 6.4 oz (105 kg)   12/31/18 225 lb (102.1 kg)     Weight stable / unchanged  Vitals: /78   Pulse 82   Ht 5' 7\" (1.702 m)   Wt 225 lb 9.6 oz (102.3 kg)   SpO2 95% Comment: room air at rest  BMI 35.33 kg/m²         General Appearance Moderately built, moderately nourished, in no acute distress. HEENT - Normal, Head is normocephalic, atraumatic. PERRL. Oral mucosa pink and moist, no oral thrush. Mallampati Score - III (soft palate, base of uvula visible). Neck - Supple, symmetrical, trachea midline and soft. Lungs - Chest symmetric with normal A/P diameter, normal respiratory rate and rhythm, lungs clear to auscultation, no wheezes, rales or rhonchi, aeration good. Cardiovascular - Heart sounds are normal. Regular rhythm normal rate without murmur, gallop or rub. Abdomen - Soft, nontender, non-distended. Neurologic - Alert and oriented x 3. Skin - No bruising or bleeding. Extremities - No cyanosis, clubbing or edema. ASSESSMENT/DIAGNOSIS     Diagnosis Orders   1. FABY on CPAP     2. Paroxysmal atrial fibrillation ([address]              Plan   Do you need any equipment today? No. I did show them different styles of CPAP mask barriers on line. They are also going to stop at [facility]. To call with any further issues or concerns. - He  was advised to continue current positive airway pressure therapy with above described pressure. - He  advised to keep goodcompliance with current recommended pressure to get optimal results and clinical improvement.  - Recommend 7-9 hours of sleep with PAP treatment. - He was advised to call [employer] regarding supplies if needed.   -He call my office for earlier appointment if needed for worsening of sleep symptoms.   - He was instructed on weight loss.   Jeana Naranjo was educated about my impression and plan. Patient verbalizes understanding. We will see Oh Baker back in: 3 months with download.     Electronically signed by ESE Burch CNP on 6/11/2019 at 9:23 AM

## 2019-07-24 ENCOUNTER — TELEPHONE (OUTPATIENT)
Dept: PULMONOLOGY | Age: 69
End: 2019-07-24

## 2019-07-24 NOTE — TELEPHONE ENCOUNTER
Lisa Stovall phoned in asking for a letter explaining the need for his CPAP machine while traveling. Him and his wife travel/fly a lot and would like to have in writing medical necessity. Please advise.

## 2019-07-25 ENCOUNTER — HOSPITAL ENCOUNTER (OUTPATIENT)
Age: 69
Discharge: HOME | End: 2019-07-25
Payer: MEDICARE

## 2019-07-25 LAB
ALBUMIN SERPL-MCNC: 4.3 G/DL (ref 3.5–5.1)
ALP BLD-CCNC: 94 U/L (ref 38–126)
ALT SERPL-CCNC: 25 U/L (ref 11–66)
ANION GAP SERPL CALCULATED.3IONS-SCNC: 13 MEQ/L (ref 8–16)
AST SERPL-CCNC: 32 U/L (ref 5–40)
BILIRUB SERPL-MCNC: 2.2 MG/DL (ref 0.3–1.2)
BILIRUBIN DIRECT: 0.4 MG/DL (ref 0–0.3)
BUN BLDV-MCNC: 17 MG/DL (ref 7–22)
CALCIUM SERPL-MCNC: 9.6 MG/DL (ref 8.5–10.5)
CHLORIDE BLD-SCNC: 104 MEQ/L (ref 98–111)
CHOLESTEROL, TOTAL: 121 MG/DL (ref 100–199)
CO2: 26 MEQ/L (ref 23–33)
CREAT SERPL-MCNC: 0.9 MG/DL (ref 0.4–1.2)
ERYTHROCYTE [DISTWIDTH] IN BLOOD BY AUTOMATED COUNT: 13.5 % (ref 11.5–14.5)
ERYTHROCYTE [DISTWIDTH] IN BLOOD BY AUTOMATED COUNT: 45.5 FL (ref 35–45)
GFR SERPL CREATININE-BSD FRML MDRD: 84 ML/MIN/1.73M2
GLUCOSE BLD-MCNC: 99 MG/DL (ref 70–108)
HCT VFR BLD CALC: 48.3 % (ref 42–52)
HDLC SERPL-MCNC: 68 MG/DL
HEMOGLOBIN: 16.3 GM/DL (ref 14–18)
LDL CHOLESTEROL CALCULATED: 40 MG/DL
MCH RBC QN AUTO: 30.7 PG (ref 26–33)
MCHC RBC AUTO-ENTMCNC: 33.7 GM/DL (ref 32.2–35.5)
MCV RBC AUTO: 91 FL (ref 80–94)
PLATELET # BLD: 141 THOU/MM3 (ref 130–400)
PMV BLD AUTO: 10.6 FL (ref 9.4–12.4)
POTASSIUM SERPL-SCNC: 4.1 MEQ/L (ref 3.5–5.2)
RBC # BLD: 5.31 MILL/MM3 (ref 4.7–6.1)
SODIUM BLD-SCNC: 143 MEQ/L (ref 135–145)
TOTAL PROTEIN: 7.1 G/DL (ref 6.1–8)
TRIGL SERPL-MCNC: 64 MG/DL (ref 0–199)
WBC # BLD: 7.1 THOU/MM3 (ref 4.8–10.8)

## 2019-07-25 PROCEDURE — 85027 COMPLETE CBC AUTOMATED: CPT

## 2019-07-25 PROCEDURE — 80061 LIPID PANEL: CPT

## 2019-07-25 PROCEDURE — 82248 BILIRUBIN DIRECT: CPT

## 2019-07-25 PROCEDURE — 36415 COLL VENOUS BLD VENIPUNCTURE: CPT

## 2019-07-25 PROCEDURE — 80053 COMPREHEN METABOLIC PANEL: CPT

## 2019-09-30 ENCOUNTER — OFFICE VISIT (OUTPATIENT)
Dept: PULMONOLOGY | Age: 69
End: 2019-09-30
Payer: MEDICARE

## 2019-09-30 VITALS
HEIGHT: 67 IN | DIASTOLIC BLOOD PRESSURE: 75 MMHG | SYSTOLIC BLOOD PRESSURE: 116 MMHG | BODY MASS INDEX: 36.95 KG/M2 | WEIGHT: 235.4 LBS | HEART RATE: 81 BPM | OXYGEN SATURATION: 94 %

## 2019-09-30 DIAGNOSIS — Z99.89 OSA ON CPAP: Primary | ICD-10-CM

## 2019-09-30 DIAGNOSIS — G47.33 OSA ON CPAP: Primary | ICD-10-CM

## 2019-09-30 PROCEDURE — 99213 OFFICE O/P EST LOW 20 MIN: CPT | Performed by: NURSE PRACTITIONER

## 2020-06-29 ENCOUNTER — OFFICE VISIT (OUTPATIENT)
Dept: PULMONOLOGY | Age: 70
End: 2020-06-29
Payer: MEDICARE

## 2020-06-29 VITALS
SYSTOLIC BLOOD PRESSURE: 144 MMHG | HEART RATE: 74 BPM | DIASTOLIC BLOOD PRESSURE: 86 MMHG | HEIGHT: 67 IN | RESPIRATION RATE: 16 BRPM | WEIGHT: 218.4 LBS | TEMPERATURE: 97.9 F | BODY MASS INDEX: 34.28 KG/M2 | OXYGEN SATURATION: 98 %

## 2020-06-29 PROCEDURE — 99213 OFFICE O/P EST LOW 20 MIN: CPT | Performed by: NURSE PRACTITIONER

## 2021-06-25 NOTE — PROGRESS NOTES
arthralgias and back pain. Skin: Negative. Allergic/Immunologic: Negative. Neurological: Negative. Negative for dizziness and light-headedness. Psychiatric/Behavioral: Negative. All other systems reviewed and are negative. Physical Exam:    BMI:  Body mass index is 33.36 kg/m². Wt Readings from Last 3 Encounters:   06/28/21 213 lb (96.6 kg)   06/29/20 218 lb 6.4 oz (99.1 kg)   09/30/19 235 lb 6.4 oz (106.8 kg)     Weight stable / unchanged  Vitals: /64 (Site: Left Upper Arm, Position: Sitting, Cuff Size: Large Adult)   Pulse 81   Temp 97.8 °F (36.6 °C) (Temporal)   Ht 5' 7\" (1.702 m)   Wt 213 lb (96.6 kg)   SpO2 99% Comment: Room air at rest  BMI 33.36 kg/m²       Physical Exam  Constitutional:       Appearance: He is well-developed. HENT:      Head: Normocephalic and atraumatic. Right Ear: External ear normal.      Left Ear: External ear normal.   Eyes:      Conjunctiva/sclera: Conjunctivae normal.      Pupils: Pupils are equal, round, and reactive to light. Cardiovascular:      Rate and Rhythm: Normal rate and regular rhythm. Heart sounds: Normal heart sounds. Pulmonary:      Effort: Pulmonary effort is normal.      Breath sounds: Normal breath sounds. Musculoskeletal:         General: Normal range of motion. Cervical back: Normal range of motion and neck supple. Skin:     General: Skin is warm and dry. Neurological:      Mental Status: He is alert and oriented to person, place, and time. Psychiatric:         Behavior: Behavior normal.         Thought Content: Thought content normal.         Judgment: Judgment normal.           ASSESSMENT/DIAGNOSIS     Diagnosis Orders   1. FABY on CPAP     2. Obesity (BMI 30-39. 9)              Plan   Do you need any equipment today?  Yes update supplies  - Discussed Recall, needs to notify DME to get on list  - Download reviewed and discussed with patient  - He  was advised to continue current positive airway pressure therapy with above described pressure. - He  advised to keep good compliance with current recommended pressure to get optimal results and clinical improvement  - Recommend 7-9 hours of sleep with PAP  - He was advised to call DME company regarding supplies if needed.   -He call my office for earlier appointment if needed for worsening of sleep symptoms.   - He was instructed on weight loss  - Eufemia Oliver was educated about my impression and plan. Patient verbalizesunderstanding.   We will see Talia Choudhury back in: 1 year with download    Information added by my medical assistant/LPN was reviewed today         Renata Rangel PA-C, MPAS  6/28/2021

## 2021-06-28 ENCOUNTER — OFFICE VISIT (OUTPATIENT)
Dept: PULMONOLOGY | Age: 71
End: 2021-06-28
Payer: MEDICARE

## 2021-06-28 VITALS
TEMPERATURE: 97.8 F | DIASTOLIC BLOOD PRESSURE: 64 MMHG | BODY MASS INDEX: 33.43 KG/M2 | HEIGHT: 67 IN | SYSTOLIC BLOOD PRESSURE: 122 MMHG | OXYGEN SATURATION: 99 % | HEART RATE: 81 BPM | WEIGHT: 213 LBS

## 2021-06-28 DIAGNOSIS — E66.9 OBESITY (BMI 30-39.9): ICD-10-CM

## 2021-06-28 DIAGNOSIS — Z99.89 OSA ON CPAP: Primary | ICD-10-CM

## 2021-06-28 DIAGNOSIS — G47.33 OSA ON CPAP: Primary | ICD-10-CM

## 2021-06-28 PROCEDURE — 99213 OFFICE O/P EST LOW 20 MIN: CPT | Performed by: PHYSICIAN ASSISTANT

## 2021-06-28 ASSESSMENT — ENCOUNTER SYMPTOMS
STRIDOR: 0
EYES NEGATIVE: 1
DIARRHEA: 0
ALLERGIC/IMMUNOLOGIC NEGATIVE: 1
NAUSEA: 0
BACK PAIN: 0
CHEST TIGHTNESS: 0
SHORTNESS OF BREATH: 0
COUGH: 0
WHEEZING: 0

## 2021-08-26 ENCOUNTER — OFFICE VISIT (OUTPATIENT)
Dept: FAMILY MEDICINE CLINIC | Age: 71
End: 2021-08-26

## 2021-08-26 VITALS
BODY MASS INDEX: 34.61 KG/M2 | WEIGHT: 220.5 LBS | HEIGHT: 67 IN | SYSTOLIC BLOOD PRESSURE: 130 MMHG | RESPIRATION RATE: 16 BRPM | HEART RATE: 72 BPM | TEMPERATURE: 96.8 F | DIASTOLIC BLOOD PRESSURE: 78 MMHG

## 2021-08-26 DIAGNOSIS — I48.0 PAF (PAROXYSMAL ATRIAL FIBRILLATION) (HCC): ICD-10-CM

## 2021-08-26 DIAGNOSIS — I25.10 CORONARY ARTERY DISEASE INVOLVING NATIVE CORONARY ARTERY OF NATIVE HEART WITHOUT ANGINA PECTORIS: ICD-10-CM

## 2021-08-26 DIAGNOSIS — M70.61 TROCHANTERIC BURSITIS OF RIGHT HIP: Primary | ICD-10-CM

## 2021-08-26 PROCEDURE — 99203 OFFICE O/P NEW LOW 30 MIN: CPT | Performed by: FAMILY MEDICINE

## 2021-08-26 RX ORDER — PREDNISONE 20 MG/1
TABLET ORAL
Qty: 24 TABLET | Refills: 0 | Status: SHIPPED | OUTPATIENT
Start: 2021-08-26 | End: 2021-12-16

## 2021-08-26 SDOH — ECONOMIC STABILITY: FOOD INSECURITY: WITHIN THE PAST 12 MONTHS, THE FOOD YOU BOUGHT JUST DIDN'T LAST AND YOU DIDN'T HAVE MONEY TO GET MORE.: NEVER TRUE

## 2021-08-26 SDOH — ECONOMIC STABILITY: FOOD INSECURITY: WITHIN THE PAST 12 MONTHS, YOU WORRIED THAT YOUR FOOD WOULD RUN OUT BEFORE YOU GOT MONEY TO BUY MORE.: NEVER TRUE

## 2021-08-26 ASSESSMENT — ENCOUNTER SYMPTOMS
TROUBLE SWALLOWING: 0
SORE THROAT: 0
COUGH: 0
ABDOMINAL PAIN: 0
CHEST TIGHTNESS: 0
BACK PAIN: 0
CONSTIPATION: 0
EYE PAIN: 0
SHORTNESS OF BREATH: 0
BLOOD IN STOOL: 0
NAUSEA: 0

## 2021-08-26 ASSESSMENT — PATIENT HEALTH QUESTIONNAIRE - PHQ9
SUM OF ALL RESPONSES TO PHQ QUESTIONS 1-9: 0
1. LITTLE INTEREST OR PLEASURE IN DOING THINGS: 0
SUM OF ALL RESPONSES TO PHQ QUESTIONS 1-9: 0
SUM OF ALL RESPONSES TO PHQ QUESTIONS 1-9: 0
2. FEELING DOWN, DEPRESSED OR HOPELESS: 0
SUM OF ALL RESPONSES TO PHQ9 QUESTIONS 1 & 2: 0

## 2021-08-26 ASSESSMENT — SOCIAL DETERMINANTS OF HEALTH (SDOH): HOW HARD IS IT FOR YOU TO PAY FOR THE VERY BASICS LIKE FOOD, HOUSING, MEDICAL CARE, AND HEATING?: NOT HARD AT ALL

## 2021-08-26 NOTE — PROGRESS NOTES
Subjective:      Patient ID: Savanna Bustillos is a 70 y.o. male. Hearing  loss   Noted   Some  issues       atrial  Fib  Par  noted     Ashd   Stable     Hip Pain   The incident occurred more than 1 week ago. The incident occurred at home. There was no injury mechanism. The pain is present in the right hip. The quality of the pain is described as aching. The pain is at a severity of 3/10. The symptoms are aggravated by movement.      Past Medical History:   Diagnosis Date    Cancer [facility])     skin    Colon polyps 2013      sheik    due  2018    Coronary artery disease 2016    stent  to lad  and  70% circ    H/O colonoscopy 2003    negative    Hyperlipidemia     Kidney stone 1994    Loss of hearing     FABY (obstructive sleep apnea)     Osteoarthritis     PAF (paroxysmal atrial fibrillation) ([facility] [address]      Past Surgical History:   Procedure Laterality Date    CARPAL TUNNEL RELEASE      left     CHOLECYSTECTOMY, LAPAROSCOPIC  3/11    COCHLEAR IMPLANT Right 08/2017    at [facility]  dr Vamshi Patino    COLONOSCOPY   2013      sheik   colon polyps    CORONARY ANGIOPLASTY WITH STENT PLACEMENT  2016  may    stent  Lda  steph    JOINT REPLACEMENT      bilateral knees    KNEE ARTHROSCOPY  1998; 4/04    left:  Right 9/04    ROTATOR CUFF REPAIR  2004    right/ left     SKIN BIOPSY      SKIN CANCER EXCISION      left AC    TOTAL KNEE ARTHROPLASTY      left 10/06; right 6/08     Social History     Socioeconomic History    Marital status:      Spouse name: Trell Ramirez Number of children: 3    Years of education: Not on file    Highest education level: Not on file   Occupational History    Not on file   Tobacco Use    Smoking status: Never Smoker    Smokeless tobacco: Never Used   Substance and Sexual Activity    Alcohol use: Yes     Comment: 1-2 beers a day    Drug use: No    Sexual activity: Yes     Partners: Female   Other Topics Concern    Not on file   Social History Narrative    Not on file Social Determinants of Health     Financial Resource Strain: Low Risk     Difficulty of Paying Living Expenses: Not hard at all   Food Insecurity: No Food Insecurity    Worried About Running Out of Food in the Last Year: Never true    Samir of Food in the Last Year: Never true   Transportation Needs:     Lack of Transportation (Medical):  Lack of Transportation (Non-Medical):    Physical Activity:     Days of Exercise per Week:     Minutes of Exercise per Session:    Stress:     Feeling of Stress :    Social Connections:     Frequency of Communication with Friends and Family:     Frequency of Social Gatherings with Friends and Family:     Attends Christian Services:     Active Member of Clubs or Organizations:     Attends Club or Organization Meetings:     Marital Status:    Intimate Partner Violence:     Fear of Current or Ex-Partner:     Emotionally Abused:     Physically Abused:     Sexually Abused:      Family History   Problem Relation Age of Onset    Heart Disease Father     High Blood Pressure Father      Review of Systems   Constitutional: Negative for fatigue and fever. HENT: Negative for congestion, ear pain, postnasal drip, sore throat and trouble swallowing. Eyes: Negative for pain. Respiratory: Negative for cough, chest tightness and shortness of breath. Cardiovascular: Negative for chest pain, palpitations and leg swelling. Gastrointestinal: Negative for abdominal pain, blood in stool, constipation and nausea. Genitourinary: Negative for difficulty urinating, frequency and urgency. Musculoskeletal: Negative for arthralgias, back pain, joint swelling and neck stiffness. Skin: Negative for rash. Neurological: Negative for dizziness, weakness and headaches. Hematological: Negative for adenopathy. Does not bruise/bleed easily. Psychiatric/Behavioral: Negative for behavioral problems, dysphoric mood and sleep disturbance.      /78 (Site: Right Upper Arm, Position: Sitting, Cuff Size: Medium Adult)   Pulse 72   Temp 96.8 °F (36 °C) (Infrared)   Resp 16   Ht 5' 7\" (1.702 m)   Wt 220 lb 8 oz (100 kg)   BMI 34.54 kg/m²   Objective:   Physical Exam  Vitals and nursing note reviewed. Constitutional:       Appearance: He is well-developed. HENT:      Head: Normocephalic and atraumatic. Right Ear: External ear normal.      Left Ear: External ear normal.      Nose: Nose normal.   Eyes:      Conjunctiva/sclera: Conjunctivae normal.      Pupils: Pupils are equal, round, and reactive to light. Comments: Fundi nl   Neck:      Thyroid: No thyromegaly. Cardiovascular:      Rate and Rhythm: Normal rate and regular rhythm. Heart sounds: Normal heart sounds. Pulmonary:      Effort: Pulmonary effort is normal.      Breath sounds: Normal breath sounds. No wheezing or rales. Abdominal:      General: Bowel sounds are normal.      Palpations: Abdomen is soft. There is no mass. Tenderness: There is no abdominal tenderness. Musculoskeletal:         General: Normal range of motion. Cervical back: Normal range of motion and neck supple. Comments:   Right  Hip  Minimal pain and  Good  rom   Lymphadenopathy:      Cervical: No cervical adenopathy. Skin:     General: Skin is warm and dry. Findings: No rash. Neurological:      Mental Status: He is alert and oriented to person, place, and time. Cranial Nerves: No cranial nerve deficit. Deep Tendon Reflexes: Reflexes are normal and symmetric. Assessment:       Diagnosis Orders   1. Trochanteric bursitis of right hip     2. PAF (paroxysmal atrial fibrillation) ([facility] [address]     3.  Coronary artery disease involving native coronary artery of native heart without angina pectoris           Plan:        Current Outpatient Medications   Medication Sig Dispense Refill    predniSONE (DELTASONE) 20 MG tablet One  Tab po  Tid   For    3  Days and  Then one  Bid  For 5 days and  Then one Tab po  Qd for 5  Days 24 tablet 0    diltiazem (CARDIZEM CD) 120 MG extended release capsule Take 1 capsule by mouth daily 30 capsule 3    ticagrelor (BRILINTA) 60 MG TABS tablet Take 60 mg by mouth 2 times daily      aspirin 81 MG tablet Take 81 mg by mouth daily      atorvastatin (LIPITOR) 40 MG tablet Take 40 mg by mouth nightly       nitroGLYCERIN (NITROSTAT) 0.4 MG SL tablet Place 1 tablet under the tongue every 5 minutes as needed for Chest pain 25 tablet 3     No current facility-administered medications for this visit. No orders of the defined types were placed in this encounter.     See in      Gloria Ahn MD

## 2021-10-22 ENCOUNTER — HOSPITAL ENCOUNTER (OUTPATIENT)
Dept: PHYSICAL THERAPY | Age: 71
Setting detail: THERAPIES SERIES
Discharge: HOME | End: 2021-10-22
Payer: MEDICARE

## 2021-10-22 PROCEDURE — 97161 PT EVAL LOW COMPLEX 20 MIN: CPT

## 2021-10-22 PROCEDURE — 97110 THERAPEUTIC EXERCISES: CPT

## 2021-10-22 NOTE — PROGRESS NOTES
** PLEASE SIGN, DATE AND TIME CERTIFICATION BELOW AND RETURN TO [facility] OUTPATIENT REHABILITATION (FAX #: [phone]). ATTEST/CO-SIGN IF ACCESSING VIA [employer]. THANK YOU.**    I certify that I have examined the patient below and determined that Physical Medicine and Rehabilitation service is necessary and that I approve the established plan of care for up to 90 days or as specifically noted.   Attestation, signature or co-signature of physician indicates approval of certification requirements.    ________________________ ____________ __________  Physician Signature   Date   Time  [address] [facility]  PHYSICAL THERAPY  [x] EVALUATION  [] DAILY NOTE (LAND) [] DAILY NOTE (AQUATIC ) [] PROGRESS NOTE [] DISCHARGE NOTE    [x] [address] [facility]   [] [place] [address]    [] [facility]   [] [facility]    Date: 10/22/2021  Patient Name:  Maria G Costa  : 1950  MRN: [id number]  CSN: [id number]    Referring Practitioner Cynthia Ceron MD   Diagnosis Strain of muscle, fascia and tendon of the posterior muscle group at thigh level, right thigh, initial encounter [Y94.399G]    Treatment Diagnosis Bilateral knee pain    Date of Evaluation 10/22/21    Additional Pertinent History A-Fib, hearing loss, OA, Left and right  TKA ,        Functional Outcome Measure Used LEFS   Functional Outcome Score 63/80 (10/22/21)       Insurance: Primary: Payor: Argenis Miles /  /  / ,   Secondary:    Authorization Information: OUTPATIENT BENEFITS:               DEDUCTIBLE: $150-MET                   OUT OF POCKET: $1500-MET $303.20              INSURANCE PAYS AT: 96%               PATIENT RESPONSIBILITY AND/OR CO-PAY: 4%  SECONDARY INSURANCE COMPANY:  NO      PRE CERTIFICATION REQUIRED: NO  INSURANCE THERAPY BENEFIT:  UNLIMITED VISITS BASED ON MEDICAL NECESSITY   AQUATIC THERAPY COVERED: YES  MODALITIES COVERED:  YES,YES MASSAGE  TELEHEALTH COVERED: YES Visit # 1, 1/10 for progress note   Visits Allowed: BMN   Recertification Date: 15/1/23   Physician Follow-Up: 11/4/21   Physician Orders:    History of Present Illness: Pt notes that notes that over the past month he started to develop bilateral anterior knee pain. Pt notes also having posterior thigh pain at times, left more than right. Pt notes that he notices the pain at times during walking and golfing. Pt notes that pain has been about the same since onset. Pt notes that pain is generally intermittent. Pt notes that knees have been feeling \"tight\". SUBJECTIVE: Pt noted starting Meloxicam Rx. On 10/7/21, which seems to help with his symptoms. Pt notes that he tries to do some stretching. Social/Functional History and Current Status:  Medications and Allergies have been reviewed and are listed on Medical History Questionnaire. Lai Gonzalez lives with spouse in a multiple floor home with stairs and no handrail to enter.     Task Previous Current   ADLs  Independent Independent   IADL's Independent Independent   Ambulation Independent Independent \"tightness\"    Transfers Independent Independent   Recreation Independent Golfing, traveling  Independent        Driving Active  Active    Work Retired  Retired     OBJECTIVE:    OBSERVATION    Comments   Pain 3/10   Posture    Palpation Mild tendenss throughout middle 1/3 of left medial HS, denied any additional tenderness   Sensation Intact to light touch, noting having some numbness at times throughout Bilat anterior thighs with longer distance ambulation   Observation    Edema    Patellar Mobility    Patellar Orientation        LOWER EXTREMITY RANGE OF MOTION    Left Right Comments   Hip Flexion      Hip Extension      Hip ABDuction      Hip ADDuction      Hip Internal Rotation      Hip External Rotation      Hip Range of Motion is [facility]  []      Knee Flexion 114 111    Knee Extension - -    Knee Range of Motion is [facility]  []      Ankle Plantarflexion      Ankle Dorsiflexion      Ankle Inversion      Ankle Eversion      Ankle Range of Motion is [facility]  []       LOWER EXTREMITY STRENGTH    Left Right Comments   Hip Flexion 5-/5 5/5    Hip Abduction 5-/5 5/5    Hip Extension 4+/5 4/5    Hip ER 5/5 5/5    Knee Flexion 5/5 5/5    Knee Extension 5/5 5/5    Ankle DF 5/5 5/5    Ankle PF 5/5 5/5    Ankle Inversion      Ankle Eversion      LE strength is [facility] []    SPECIAL TESTS (+/-)    Left Right Comments   Ant.  Drawer      Lachman's      Post. Drawer      Valgus Stress      Varus Stress      McMurray-Anderson Apley      Thessaly      Ekaterina + + R tighter than left    Prone Quad 85 80    90/90 hamstring length 155 142    Brad + Hip felxor / Quad  + Hip felxor / Quad  Right quad tighter than left      GAIT ANALYSIS: decreased right stride length     BALANCE/PROPRIOCEPTION          Single leg stance                                                      Left Right Comments   Eyes open                                    5s    Sec   7s      Sec    Eyes closed                                                   Sec         Sec        FUNCTIONAL TESTS    Pain No Pain Comments   Stair navigation  x reciprocal    Squat x  Left lateral thigh pain     Sit to Stand      Floor transfer          TREATMENT   Precautions:    Pain:     X in shaded column indicates activity completed today   Modalities Parameters/  Location  Notes                     Manual Therapy Time/Technique  Notes   Brad stretch with OP into hip extension/knee flexion                   Exercise/Intervention   Notes   Prone Quad stretch  30s ea  x    Seated HS Stretch  30s ea  x    Supine strap IT band stretch  30s ea  x    Hip Flexor stretch       Step calf stretch        Resisted clamshell               t-band resisted hip flexion/abduction/extension        Resisted side stepping               Alt step taps from foam                             SLS       Pt education (Pt provided with handouts to ensure proper home performance and frequency)   x      Interventions Next Treatment: Assess Hip ROM, LE flexibility, hip strengthening, balance. Activity/Treatment Tolerance:  [x]  Patient tolerated treatment well  []  Patient limited by fatigue  []  Patient limited by pain   []  Patient limited by medical complications  []  Other:     Assessment: Pt presents to clinic with BLE pain, decreased flexibility, hip strength, and balance. Pt to benefit from skilled PT to address the above deficits. Pt demo good tolerance to therex initiaited this date. Extended time taken this date to ensure understanding of mechanics, POC, and HEP secondary to hearing deficit. Pt requests HEP progression vs formal PT. Body Structures/Functions/Activity Limitations: impaired balance, impaired ROM and impaired strength  Prognosis: fair    GOALS:  Patient Goal: Get rid of his left thigh pain, improve balance     Short Term Goals: Deferred to Long term     Long Term Goals: 6 Weeks   Pt to improve LEFS score from 63/80 to >=72/80 to indicate improved activity tolerance. Pt to demo improved bilat HS length to >= 160 degrees, prone quad >90 bilat for improved body mechanics. Pt to be able to demo ability to maintain SLS >= 15s bilat for improved LE stability/balance. Pt to be compliant and independent with HEP. Patient Education:   [x]  HEP/Education Completed: Plan of Care, Goals, body mechanics, and HEP.  [employer] Access Code: [email or web address]  []  No new Education completed  []  Reviewed Prior HEP      [x]  Patient verbalized and/or demonstrated understanding of education provided. []  Patient unable to verbalize and/or demonstrate understanding of education provided. Will continue education. [x]  Barriers to learning:  Hard of hearing/following directions. Provided with print out of exercises.      PLAN:  Treatment Recommendations: Strengthening, Range of Motion, Balance Training, Manual Therapy - Soft Tissue Mobilization, Home Exercise Program and Patient Education    [x]  Plan of care initiated. Plan to see patient 2 times per week for 6 weeks to address the treatment planned outlined above.   []  Continue with current plan of care  []  Modify plan of care as follows:    []  Hold pending physician visit  []  Discharge    Time In 1345   Time Out 1438   Timed Code Minutes: 8 min   Total Treatment Time: 53 min     Electronically Signed by: Eliana Dugan PT

## 2021-10-25 ENCOUNTER — HOSPITAL ENCOUNTER (OUTPATIENT)
Dept: PHYSICAL THERAPY | Age: 71
Setting detail: THERAPIES SERIES
Discharge: HOME | End: 2021-10-25
Payer: MEDICARE

## 2021-10-25 PROCEDURE — 97110 THERAPEUTIC EXERCISES: CPT

## 2021-10-25 NOTE — PROGRESS NOTES
he has most pain along R glut/hip area and into B knees. Pateint feels the stretches are helping with decreasing knee pain. Patient reports compliance with HEP. TREATMENT   Precautions:    Pain: 4/10 R glut/hip and into B knees    X in shaded column indicates activity completed today   Modalities Parameters/  Location  Notes                     Manual Therapy Time/Technique  Notes   Brad stretch with OP into hip extension/knee flexion                   Exercise/Intervention   Notes   Prone Quad stretch  30s ea 2x x    Seated HS Stretch  30s ea 2x x    Supine strap IT band stretch  30s ea 2x x    Hip Flexor stretch 30s ea 2x x    Step calf stretch  30s ea 2x x    Resisted clamshell  10x  x orange          t-band resisted hip flexion/abduction/extension        Resisted side stepping               Alt step taps from foam                             SLS       Pt education (Pt provided with handouts to ensure proper home performance and frequency)   x      Interventions Next Treatment: Assess Hip ROM, LE flexibility, hip strengthening, balance. Activity/Treatment Tolerance:  [x]  Patient tolerated treatment well  []  Patient limited by fatigue  []  Patient limited by pain   []  Patient limited by medical complications  []  Other:     Assessment: Patient completed exercises as listed above working on flexibility and and strength. Added hip flexor stretch, step calf stretch and resisted clamshell to progress. Cues provided for patient to complete exercises correctly. Patient given updated HO for  HEP. Will continue to progress as tolerated by patient per POC. GOALS:  Patient Goal: Get rid of his left thigh pain, improve balance     Short Term Goals: Deferred to Long term     Long Term Goals: 6 Weeks   Pt to improve LEFS score from 63/80 to >=72/80 to indicate improved activity tolerance. Pt to demo improved bilat HS length to >= 160 degrees, prone quad >90 bilat for improved body mechanics.   Pt to be able to demo ability to maintain SLS >= 15s bilat for improved LE stability/balance. Pt to be compliant and independent with HEP. Patient Education:   [x]  HEP/Education Completed: Plan of Care, Goals, body mechanics, and HEP.  [employer] Access Code: [email or web address]  []  No new Education completed  []  Reviewed Prior HEP      [x]  Patient verbalized and/or demonstrated understanding of education provided. []  Patient unable to verbalize and/or demonstrate understanding of education provided. Will continue education. [x]  Barriers to learning:  Hard of hearing/following directions. Provided with print out of exercises. PLAN:  Treatment Recommendations: Strengthening, Range of Motion, Balance Training, Manual Therapy - Soft Tissue Mobilization, Home Exercise Program and Patient Education    [x]  Plan of care initiated. Plan to see patient 2 times per week for 6 weeks to address the treatment planned outlined above.   []  Continue with current plan of care  []  Modify plan of care as follows:    []  Hold pending physician visit  []  Discharge    Time In 1350   Time Out 1425   Timed Code Minutes: 35 min   Total Treatment Time: 35 min     Electronically Signed by: Marion Bowers PTA

## 2021-10-29 ENCOUNTER — HOSPITAL ENCOUNTER (OUTPATIENT)
Dept: PHYSICAL THERAPY | Age: 71
Setting detail: THERAPIES SERIES
Discharge: HOME | End: 2021-10-29
Payer: MEDICARE

## 2021-10-29 PROCEDURE — 97110 THERAPEUTIC EXERCISES: CPT

## 2021-10-29 NOTE — PROGRESS NOTES
[address] [facility]  PHYSICAL THERAPY  [] EVALUATION  [x] DAILY NOTE (LAND) [] DAILY NOTE (AQUATIC ) [] PROGRESS NOTE [] DISCHARGE NOTE    [x] OUTPATIENT REHABILITATION [facility]   [] [place] [address]    [] [facility]   [] [facility]    Date: 10/29/2021  Patient Name:  Fabián Valera  : 1950  MRN: [id number]  CSN: [id number]    Referring Practitioner Gini Balderas MD   Diagnosis Strain of muscle, fascia and tendon of the posterior muscle group at thigh level, right thigh, initial encounter [I89.817O]    Treatment Diagnosis Bilateral knee pain    Date of Evaluation 10/22/21    Additional Pertinent History A-Fib, hearing loss, OA, Left and right  TKA ,        Functional Outcome Measure Used LEFS   Functional Outcome Score 63/80 (10/22/21)       Insurance: Primary: Payor: Erika Aburto /  /  / ,   Secondary:    Authorization Information: OUTPATIENT BENEFITS:               DEDUCTIBLE: $150-MET                   OUT OF POCKET: $1500-MET $303.20              INSURANCE PAYS AT: 96%               PATIENT RESPONSIBILITY AND/OR CO-PAY: 4%  SECONDARY INSURANCE COMPANY:  NO      PRE CERTIFICATION REQUIRED: NO  INSURANCE THERAPY BENEFIT:  UNLIMITED VISITS BASED ON MEDICAL NECESSITY   AQUATIC THERAPY COVERED: YES  MODALITIES COVERED:  YES,YES MASSAGE  TELEHEALTH COVERED: YES   Visit # 3, 3/10 for progress note   Visits Allowed: BMN   Recertification Date:    Physician Follow-Up: 21   Physician Orders:    History of Present Illness: Pt notes that notes that over the past month he started to develop bilateral anterior knee pain. Pt notes also having posterior thigh pain at times, left more than right. Pt notes that he notices the pain at times during walking and golfing. Pt notes that pain has been about the same since onset. Pt notes that pain is generally intermittent. Pt notes that knees have been feeling \"tight\".       SUBJECTIVE: Pt stated R hip pain is gradually getting better. Pt stated he only notices the pain when twisting wrong. TREATMENT   Precautions:    Pain: denies    X in shaded column indicates activity completed today   Modalities Parameters/  Location  Notes                     Manual Therapy Time/Technique  Notes   Brad stretch with OP into hip extension/knee flexion   x R side only               Exercise/Intervention   Notes   Prone Quad stretch  30s ea 2x x    Seated HS Stretch  30s ea 2x x    Supine strap IT band stretch  30s ea 2x x    Hip Flexor stretch 30s ea 2x     Step calf stretch  30s ea 2x x    Resisted clamshell  10x  x orange          Standing on airex: Heel toe raises, mini squats, HS curls 10x  x    t-band resisted hip flexion/abduction/extension  10x orange x    Dynamic gait: heel toe walking, Resisted side stepping  2laps orange x           Alt step taps on and off  foam  10x  x Occasionally catches toes   SLS 10x  X  R LE weaker requiring hand taps   rockerboard 15taps 30 sec. X  Forward, lateral, two fingers required for balance   Tandem standing 20 sec 2x x occasional hand taps          Pt education (Pt provided with handouts to ensure proper home performance and frequency)         Interventions Next Treatment: Assess Hip ROM, LE flexibility, hip strengthening, balance. Activity/Treatment Tolerance:  [x]  Patient tolerated treatment well  []  Patient limited by fatigue  []  Patient limited by pain   []  Patient limited by medical complications  []  Other:     Assessment: Pt is able to complete above listed stretches with no pain. Tightness noted in B quads more so on R side. Added balance activitiy this session. No pain at end of session. GOALS:  Patient Goal: Get rid of his left thigh pain, improve balance     Short Term Goals: Deferred to Long term     Long Term Goals: 6 Weeks   Pt to improve LEFS score from 63/80 to >=72/80 to indicate improved activity tolerance.    Pt to demo improved bilat HS length to >= 160 degrees, prone quad >90 bilat for improved body mechanics. Pt to be able to demo ability to maintain SLS >= 15s bilat for improved LE stability/balance. Pt to be compliant and independent with HEP. Patient Education:   [x]  HEP/Education Completed: tandem standing, SLS   [email or web address] Access Code: [email or web address]  []  No new Education completed  [x]  Reviewed Prior HEP      [x]  Patient verbalized and/or demonstrated understanding of education provided. []  Patient unable to verbalize and/or demonstrate understanding of education provided. Will continue education. [x]  Barriers to learning:  Hard of hearing/following directions. Provided with print out of exercises. PLAN:  Treatment Recommendations: Strengthening, Range of Motion, Balance Training, Manual Therapy - Soft Tissue Mobilization, Home Exercise Program and Patient Education    []  Plan of care initiated. Plan to see patient 2 times per week for 6 weeks to address the treatment planned outlined above.   [x]  Continue with current plan of care  []  Modify plan of care as follows:    []  Hold pending physician visit  []  Discharge    Time In 0745   Time Out 0825   Timed Code Minutes: 40 min   Total Treatment Time: 40 min     Electronically Signed by: Vance Gutierrez PTA

## 2021-11-04 ENCOUNTER — HOSPITAL ENCOUNTER (OUTPATIENT)
Dept: PHYSICAL THERAPY | Age: 71
Setting detail: THERAPIES SERIES
Discharge: HOME | End: 2021-11-04
Payer: MEDICARE

## 2021-11-04 PROCEDURE — 97110 THERAPEUTIC EXERCISES: CPT

## 2021-11-04 NOTE — PROGRESS NOTES
[address] [facility]  PHYSICAL THERAPY  [] EVALUATION  [x] DAILY NOTE (LAND) [] DAILY NOTE (AQUATIC ) [] PROGRESS NOTE [] DISCHARGE NOTE    [x] OUTPATIENT REHABILITATION [facility]   [] [place] [address]    [] [facility]   [] Cyrus Romano    Date: 2021  Patient Name:  Rabia Bey  : 1950  MRN: [id number]  CSN: [id number]    Referring Practitioner Donna Ramirez MD   Diagnosis Strain of muscle, fascia and tendon of the posterior muscle group at thigh level, right thigh, initial encounter [X27.784W]    Treatment Diagnosis Bilateral knee pain    Date of Evaluation 10/22/21    Additional Pertinent History A-Fib, hearing loss, OA, Left and right  TKA ,        Functional Outcome Measure Used LEFS   Functional Outcome Score 63/80 (10/22/21)       Insurance: Primary: Payor: Gabriel Sofia /  /  / ,   Secondary:    Authorization Information: OUTPATIENT BENEFITS:               DEDUCTIBLE: $150-MET                   OUT OF POCKET: $1500-MET $303.20              INSURANCE PAYS AT: 96%               PATIENT RESPONSIBILITY AND/OR CO-PAY: 4%  SECONDARY INSURANCE COMPANY:  NO      PRE CERTIFICATION REQUIRED: NO  INSURANCE THERAPY BENEFIT:  UNLIMITED VISITS BASED ON MEDICAL NECESSITY   AQUATIC THERAPY COVERED: YES  MODALITIES COVERED:  YES,YES MASSAGE  TELEHEALTH COVERED: YES   Visit # 4, 4/10 for progress note   Visits Allowed: BMN   Recertification Date:    Physician Follow-Up: 21   Physician Orders:    History of Present Illness: Pt notes that notes that over the past month he started to develop bilateral anterior knee pain. Pt notes also having posterior thigh pain at times, left more than right. Pt notes that he notices the pain at times during walking and golfing. Pt notes that pain has been about the same since onset. Pt notes that pain is generally intermittent. Pt notes that knees have been feeling \"tight\".       SUBJECTIVE:  Pt notes having intermittent lateral hip pain, more often on Right than left. Pt notes having the pain with increased walking. Pt notes feeling that overall his symptoms have been improving. Objective:   SLS: Left: 12   Right: 5s    TREATMENT   Precautions:    Pain: denies    X in shaded column indicates activity completed today   Modalities Parameters/  Location  Notes                     Manual Therapy Time/Technique  Notes   Brad stretch with OP into hip extension/knee flexion   x R/L                Exercise/Intervention   Notes   Prone Quad stretch (towel roll under thigh) * 30s ea 2x x Added towel roll this session for increased hip flexor mobility, cueing to avoid rotating away from stretch. Seated HS Stretch  30s ea 2x x Cueing to hinge forward at hip    Supine strap IT band stretch  30s ea 2x x    Hip Flexor stretch 30s ea 2x     Step calf stretch  30s ea 2x     Resisted clamshell  2x10 ea  x orange   Quadruped posterior hip stretch * 2x30s ea   x Verbal/tactile cueing for proper mechanics. Standing on airex: Heel toe raises, mini squats, HS curls 10x  x    t-band resisted hip flexion/abduction/extension  10x orange     Dynamic gait: heel toe walking, Resisted side stepping  2laps orange     t-band resisted bridge Orange* 2x10  x    Alt step taps from foam to 8\" step * 2x10  x Fair control , cueing for \"light feet\"    SLS 2x30s  X     rockerboard 15taps 30 sec. Forward, lateral, two fingers required for balance   Tandem standing 20 sec 2x  occasional hand taps          Pt education (Pt provided with handouts to ensure proper home performance and frequency)         Interventions Next Treatment: LE flexibility, hip strengthening, balance.      Activity/Treatment Tolerance:  [x]  Patient tolerated treatment well  []  Patient limited by fatigue  []  Patient limited by pain   []  Patient limited by medical complications  []  Other:     Assessment: Increased time spent throughout PT session with communication secondary to Pt being hard of hearing. Verbal/tactile cueing required throughout session to ensure proper body mechanics c completion. Progressions indicated by * throughout flow sheet. Pt denied any adverse effects throughout PT session and at the completion of the PT session. GOALS:  Patient Goal: Get rid of his left thigh pain, improve balance     Short Term Goals: Deferred to Long term     Long Term Goals: 6 Weeks   Pt to improve LEFS score from 63/80 to >=72/80 to indicate improved activity tolerance. Pt to demo improved bilat HS length to >= 160 degrees, prone quad >90 bilat for improved body mechanics. Pt to be able to demo ability to maintain SLS >= 15s bilat for improved LE stability/balance. Pt to be compliant and independent with HEP. Patient Education:   [x]  HEP/Education Completed: Body mechanics and updated HEP. [facility] Access Code: [email or web address]  []  No new Education completed  [x]  Reviewed Prior HEP      [x]  Patient verbalized and/or demonstrated understanding of education provided. []  Patient unable to verbalize and/or demonstrate understanding of education provided. Will continue education. [x]  Barriers to learning:  Hard of hearing/following directions. Provided with print out of exercises. PLAN:  Treatment Recommendations: Strengthening, Range of Motion, Balance Training, Manual Therapy - Soft Tissue Mobilization, Home Exercise Program and Patient Education    []  Plan of care initiated. Plan to see patient 2 times per week for 6 weeks to address the treatment planned outlined above.   [x]  Continue with current plan of care  []  Modify plan of care as follows:    []  Hold pending physician visit  []  Discharge    Time In 1431   Time Out 1515   Timed Code Minutes: 44   Total Treatment Time: 44     Electronically Signed by: Emma Solomon, PT

## 2021-11-05 ENCOUNTER — APPOINTMENT (OUTPATIENT)
Dept: PHYSICAL THERAPY | Age: 71
End: 2021-11-05
Payer: MEDICARE

## 2021-11-11 ENCOUNTER — HOSPITAL ENCOUNTER (OUTPATIENT)
Dept: PHYSICAL THERAPY | Age: 71
Setting detail: THERAPIES SERIES
Discharge: HOME | End: 2021-11-11
Payer: MEDICARE

## 2021-11-11 PROCEDURE — 97110 THERAPEUTIC EXERCISES: CPT

## 2021-11-11 NOTE — PROGRESS NOTES
[address] [facility]  PHYSICAL THERAPY  [] EVALUATION  [x] DAILY NOTE (LAND) [] DAILY NOTE (AQUATIC ) [] PROGRESS NOTE [] DISCHARGE NOTE    [x] OUTPATIENT REHABILITATION [facility]   [] [place] [address]    [] [facility]   [] Rosalia Rizvi    Date: 2021  Patient Name:  Shantal López  : 1950  MRN: [id number]  CSN: [id number]    Referring Practitioner Delight Kehr, MD   Diagnosis Strain of muscle, fascia and tendon of the posterior muscle group at thigh level, right thigh, initial encounter [F13.735K]    Treatment Diagnosis Bilateral knee pain    Date of Evaluation 10/22/21    Additional Pertinent History A-Fib, hearing loss, OA, Left and right  TKA ,        Functional Outcome Measure Used LEFS   Functional Outcome Score 63/80 (10/22/21)       Insurance: Primary: Payor: Natty Gilliam /  /  / ,   Secondary:    Authorization Information: OUTPATIENT BENEFITS:               DEDUCTIBLE: $150-MET                   OUT OF POCKET: $1500-MET $303.20              INSURANCE PAYS AT: 96%               PATIENT RESPONSIBILITY AND/OR CO-PAY: 4%  SECONDARY INSURANCE COMPANY:  NO      PRE CERTIFICATION REQUIRED: NO  INSURANCE THERAPY BENEFIT:  UNLIMITED VISITS BASED ON MEDICAL NECESSITY   AQUATIC THERAPY COVERED: YES  MODALITIES COVERED:  YES,YES MASSAGE  TELEHEALTH COVERED: YES   Visit # 5, 5/10 for progress note   Visits Allowed: BMN   Recertification Date:    Physician Follow-Up: 21   Physician Orders:    History of Present Illness: Pt notes that notes that over the past month he started to develop bilateral anterior knee pain. Pt notes also having posterior thigh pain at times, left more than right. Pt notes that he notices the pain at times during walking and golfing. Pt notes that pain has been about the same since onset. Pt notes that pain is generally intermittent. Pt notes that knees have been feeling \"tight\".       SUBJECTIVE:  Pt notes that he has random intermittent lateral thigh pain with WBing activity. Pt notes that exercising seems to be helping with his symptoms. Pt notes feeling \"fine\" after his last PT session. Objective:   90/90 HS Length:  Left: 158   Right: 154    TREATMENT   Precautions:    Pain: denies    X in shaded column indicates activity completed today   Modalities Parameters/  Location  Notes                     Manual Therapy Time/Technique  Notes   Brad stretch with OP into hip extension/knee flexion   x R/L    HS Stretch contract/relax  x          Exercise/Intervention   Notes   Prone Quad stretch (towel roll under thigh)  1 min ea  x  towel roll  for increased hip flexor mobility, cueing to avoid rotating away from stretch. Seated HS Stretch  30s ea 2x  Cueing to hinge forward at hip    Supine strap IT band stretch  30s ea 2x x Cueing to avoid trunk rotation    Hip Flexor stretch 30s ea 2x     Step calf stretch  30s ea 2x     Resisted clamshell  15 ea  x Green * Cueing   Quadruped posterior hip stretch * 2x30s ea    Verbal/tactile cueing for proper mechanics. Standing on airex: Heel toe raises, mini squats, HS curls 10x      t-band resisted hip flexion/abduction/extension  15x Green  x     Resisted side stepping  2laps Green  x    t-band resisted bridge green* 2x10  x    Alt step taps from foam to 8\" step  2x10  x Fair control , cueing for \"light feet\"    SLS 2x30s  X     rockerboard 15taps 30 sec. Forward, lateral, two fingers required for balance   Tandem standing 20 sec 2x  occasional hand taps          Pt education (Pt provided with handouts to ensure proper home performance and frequency)         Interventions Next Treatment: LE flexibility, hip strengthening, balance.      Activity/Treatment Tolerance:  [x]  Patient tolerated treatment well  []  Patient limited by fatigue  []  Patient limited by pain   []  Patient limited by medical complications  []  Other:     Assessment: Progressed t-band resistance

## 2021-11-12 ENCOUNTER — APPOINTMENT (OUTPATIENT)
Dept: PHYSICAL THERAPY | Age: 71
End: 2021-11-12
Payer: MEDICARE

## 2021-11-29 ENCOUNTER — HOSPITAL ENCOUNTER (OUTPATIENT)
Dept: PHYSICAL THERAPY | Age: 71
Setting detail: THERAPIES SERIES
Discharge: HOME | End: 2021-11-29
Payer: MEDICARE

## 2021-11-29 PROCEDURE — 97110 THERAPEUTIC EXERCISES: CPT

## 2021-11-29 NOTE — DISCHARGE SUMMARY
[address] [facility]  PHYSICAL THERAPY  [] EVALUATION  [] DAILY NOTE (LAND) [] DAILY NOTE (AQUATIC ) [] PROGRESS NOTE [x] DISCHARGE NOTE    [x] OUTPATIENT REHABILITATION [facility]   [] [place] [address]    [] [facility]   [] Dmitri Hazard    Date: 2021  Patient Name:  Dane Laurent  : 1950  MRN: [id number]  CSN: [id number]    Referring Practitioner Juan J Vallecillo MD   Diagnosis Strain of muscle, fascia and tendon of the posterior muscle group at thigh level, right thigh, initial encounter [Q44.042B]    Treatment Diagnosis Bilateral knee pain    Date of Evaluation 10/22/21    Additional Pertinent History A-Fib, hearing loss, OA, Left and right  TKA ,        Functional Outcome Measure Used LEFS   Functional Outcome Score 63/80 (10/22/21) , 75/80 21      Insurance: Primary: Payor: Vida Wells /  /  / ,   Secondary:    Authorization Information: OUTPATIENT BENEFITS:               DEDUCTIBLE: $150-MET                   OUT OF POCKET: $1500-MET $303.20              INSURANCE PAYS AT: 96%               PATIENT RESPONSIBILITY AND/OR CO-PAY: 4%  SECONDARY INSURANCE COMPANY:  NO      PRE CERTIFICATION REQUIRED: NO  INSURANCE THERAPY BENEFIT:  UNLIMITED VISITS BASED ON MEDICAL NECESSITY   AQUATIC THERAPY COVERED: YES  MODALITIES COVERED:  YES,YES MASSAGE  TELEHEALTH COVERED: YES   Visit # 6, 6/10 for progress note   Visits Allowed: BMN   Recertification Date:    Physician Follow-Up: 21   Physician Orders:    History of Present Illness: Pt notes that notes that over the past month he started to develop bilateral anterior knee pain. Pt notes also having posterior thigh pain at times, left more than right. Pt notes that he notices the pain at times during walking and golfing. Pt notes that pain has been about the same since onset. Pt notes that pain is generally intermittent. Pt notes that knees have been feeling \"tight\". SUBJECTIVE:   Pt notes having some right lateral right hip soreness at time, knees feel tight at time. Pt denies having any pain lately. Pt notes feeling BLE strength has improved since starting therapy. Pt notes that overall the frequency and intensity of his pain has improved since starting therapy. Pt notes compliant with HEP. Objective:   90/90 HS Length:  Left: 156  Right: 158  Prone Quad Length:  Left: 100 Right:  102    SLS:   Right:  18s     Left: 6s    TREATMENT   Precautions:    Pain: denies    X in shaded column indicates activity completed today   Modalities Parameters/  Location  Notes                     Manual Therapy Time/Technique  Notes   Brad stretch with OP into hip extension/knee flexion    R/L    HS Stretch contract/relax            Exercise/Intervention   Notes   Prone Quad stretch (towel roll under thigh)  1 min ea  x  towel roll  for increased hip flexor mobility, cueing to avoid rotating away from stretch. Strap HS Stretch  30s ea 2x x     Supine strap IT band stretch  30s ea 2x x Cueing to avoid trunk rotation    Hip Flexor stretch 30s ea 2x     Step calf stretch  30s ea 2x     Resisted clamshell (Green) 15 ea  x Cueing to avoid trunk rotation c completion    Quadruped posterior hip stretch * 2x30s ea    Verbal/tactile cueing for proper mechanics. Standing on airex: Heel toe raises, mini squats, HS curls 10x      t-band resisted hip flexion/abduction/extension  15x Green  x Cueing to maintain upright trunk c completion     Resisted side stepping  2laps Green  x    t-band resisted bridge green* [id number]  x    Alt step taps from foam to 8\" step  2x10   Fair control , cueing for \"light feet\"    SLS 2x30s  X     rockerboard 15taps 30 sec.    Forward, lateral, two fingers required for balance   Tandem standing 20 sec 2x  occasional hand taps   Objective/Goal assessment and Pt education    x    Pt education (Pt provided with handouts to ensure proper home performance and frequency)         Interventions Next Treatment: LE flexibility, hip strengthening, balance. Activity/Treatment Tolerance:  [x]  Patient tolerated treatment well  []  Patient limited by fatigue  []  Patient limited by pain   []  Patient limited by medical complications  []  Other:     Assessment: Pt has completed 6 PT visits with focus on HEP progression per Pt request. Pt has currently met 2/4 therapy goals with making progress towards unmet goals. Pt demo improved flexibility, function, and decreased pain levels since initiaiting therapy. Pt currently pleased with progress and to continue to work on deficits with HEP. GOALS:  Patient Goal: Get rid of his left thigh pain, improve balance     Short Term Goals: Deferred to Long term     Long Term Goals: 6 Weeks   Pt to improve LEFS score from 63/80 to >=72/80 to indicate improved activity tolerance. GOAL MET:  75/80. Discontinue Goal  Pt to demo improved bilat HS length to >= 160 degrees, prone quad >90 bilat for improved body mechanics. GOAL NOT MET: HS not met, Quad Met . Discontinue Goal  Pt to be able to demo ability to maintain SLS >= 15s bilat for improved LE stability/balance. GOAL NOT MET:  .  Discontinue Goal  Pt to be compliant and independent with HEP. GOAL MET:   .  Discontinue Goal      Patient Education:   [x]  HEP/Education Completed: Body mechanics, PT POC, exercise modifications, and updated HEP. [employer] Access Code: [email or web address]  []  No new Education completed  [x]  Reviewed Prior HEP      [x]  Patient verbalized and/or demonstrated understanding of education provided. []  Patient unable to verbalize and/or demonstrate understanding of education provided. Will continue education. [x]  Barriers to learning:  Hard of hearing/following directions. Provided with print out of exercises.      PLAN:  Treatment Recommendations: Strengthening, Range of Motion, Balance Training, Manual Therapy - Soft Tissue Mobilization, Home Exercise Program and Patient Education    []  Plan of care initiated. Plan to see patient 2 times per week for 6 weeks to address the treatment planned outlined above.   []  Continue with current plan of care  []  Modify plan of care as follows:    []  Hold pending physician visit  [x]  Discharge    Time In 1443   Time Out 1530   Timed Code Minutes: 47   Total Treatment Time: 47     Electronically Signed by: Shelly Mcgraw PT

## 2021-12-16 ENCOUNTER — OFFICE VISIT (OUTPATIENT)
Dept: FAMILY MEDICINE CLINIC | Age: 71
End: 2021-12-16

## 2021-12-16 VITALS
RESPIRATION RATE: 14 BRPM | HEIGHT: 67 IN | SYSTOLIC BLOOD PRESSURE: 128 MMHG | WEIGHT: 216.5 LBS | DIASTOLIC BLOOD PRESSURE: 84 MMHG | BODY MASS INDEX: 33.98 KG/M2 | TEMPERATURE: 97 F | HEART RATE: 78 BPM

## 2021-12-16 DIAGNOSIS — E78.00 PURE HYPERCHOLESTEROLEMIA: ICD-10-CM

## 2021-12-16 DIAGNOSIS — N40.0 BENIGN PROSTATIC HYPERPLASIA WITHOUT LOWER URINARY TRACT SYMPTOMS: ICD-10-CM

## 2021-12-16 DIAGNOSIS — I48.0 PAF (PAROXYSMAL ATRIAL FIBRILLATION) (HCC): Primary | ICD-10-CM

## 2021-12-16 DIAGNOSIS — Z00.00 ROUTINE GENERAL MEDICAL EXAMINATION AT A HEALTH CARE FACILITY: ICD-10-CM

## 2021-12-16 DIAGNOSIS — H90.3 SENSORINEURAL HEARING LOSS (SNHL) OF BOTH EARS: ICD-10-CM

## 2021-12-16 DIAGNOSIS — H90.3 ASNHL (ASYMMETRICAL SENSORINEURAL HEARING LOSS): ICD-10-CM

## 2021-12-16 DIAGNOSIS — I25.10 CORONARY ARTERY DISEASE INVOLVING NATIVE CORONARY ARTERY OF NATIVE HEART WITHOUT ANGINA PECTORIS: ICD-10-CM

## 2021-12-16 DIAGNOSIS — K63.5 POLYP OF COLON, UNSPECIFIED PART OF COLON, UNSPECIFIED TYPE: ICD-10-CM

## 2021-12-16 PROCEDURE — 99213 OFFICE O/P EST LOW 20 MIN: CPT | Performed by: FAMILY MEDICINE

## 2021-12-16 PROCEDURE — G0439 PPPS, SUBSEQ VISIT: HCPCS | Performed by: FAMILY MEDICINE

## 2021-12-16 RX ORDER — METOPROLOL SUCCINATE 25 MG/1
TABLET, EXTENDED RELEASE ORAL
COMMUNITY
Start: 2021-12-11 | End: 2022-09-28 | Stop reason: SDUPTHER

## 2021-12-16 ASSESSMENT — LIFESTYLE VARIABLES
HOW OFTEN DURING THE LAST YEAR HAVE YOU FAILED TO DO WHAT WAS NORMALLY EXPECTED FROM YOU BECAUSE OF DRINKING: 0
AUDIT TOTAL SCORE: 3
HOW OFTEN DO YOU HAVE A DRINK CONTAINING ALCOHOL: 3
HOW OFTEN DURING THE LAST YEAR HAVE YOU NEEDED AN ALCOHOLIC DRINK FIRST THING IN THE MORNING TO GET YOURSELF GOING AFTER A NIGHT OF HEAVY DRINKING: 0
HOW OFTEN DURING THE LAST YEAR HAVE YOU BEEN UNABLE TO REMEMBER WHAT HAPPENED THE NIGHT BEFORE BECAUSE YOU HAD BEEN DRINKING: 0
HOW MANY STANDARD DRINKS CONTAINING ALCOHOL DO YOU HAVE ON A TYPICAL DAY: 0
AUDIT-C TOTAL SCORE: 3
HOW OFTEN DURING THE LAST YEAR HAVE YOU HAD A FEELING OF GUILT OR REMORSE AFTER DRINKING: 0
HOW OFTEN DO YOU HAVE SIX OR MORE DRINKS ON ONE OCCASION: 0
HAVE YOU OR SOMEONE ELSE BEEN INJURED AS A RESULT OF YOUR DRINKING: 0
HOW OFTEN DURING THE LAST YEAR HAVE YOU FOUND THAT YOU WERE NOT ABLE TO STOP DRINKING ONCE YOU HAD STARTED: 0
HAS A RELATIVE, FRIEND, DOCTOR, OR ANOTHER HEALTH PROFESSIONAL EXPRESSED CONCERN ABOUT YOUR DRINKING OR SUGGESTED YOU CUT DOWN: 0

## 2021-12-16 ASSESSMENT — PATIENT HEALTH QUESTIONNAIRE - PHQ9
SUM OF ALL RESPONSES TO PHQ QUESTIONS 1-9: 0
SUM OF ALL RESPONSES TO PHQ9 QUESTIONS 1 & 2: 0
1. LITTLE INTEREST OR PLEASURE IN DOING THINGS: 0
SUM OF ALL RESPONSES TO PHQ QUESTIONS 1-9: 0
2. FEELING DOWN, DEPRESSED OR HOPELESS: 0
SUM OF ALL RESPONSES TO PHQ QUESTIONS 1-9: 0

## 2021-12-16 ASSESSMENT — ENCOUNTER SYMPTOMS
EYE PAIN: 0
CHEST TIGHTNESS: 0
BACK PAIN: 0
BLOOD IN STOOL: 0
CONSTIPATION: 0
ABDOMINAL PAIN: 0
TROUBLE SWALLOWING: 0
SORE THROAT: 0
SHORTNESS OF BREATH: 0
NAUSEA: 0
COUGH: 0

## 2021-12-16 NOTE — PATIENT INSTRUCTIONS
Personalized Preventive Plan for Dane Laurent - 12/16/2021  Medicare offers a range of preventive health benefits. Some of the tests and screenings are paid in full while other may be subject to a deductible, co-insurance, and/or copay. Some of these benefits include a comprehensive review of your medical history including lifestyle, illnesses that may run in your family, and various assessments and screenings as appropriate. After reviewing your medical record and screening and assessments performed today your provider may have ordered immunizations, labs, imaging, and/or referrals for you. A list of these orders (if applicable) as well as your Preventive Care list are included within your After Visit Summary for your review. Other Preventive Recommendations:    · A preventive eye exam performed by an eye specialist is recommended every 1-2 years to screen for glaucoma; cataracts, macular degeneration, and other eye disorders. · A preventive dental visit is recommended every 6 months. · Try to get at least 150 minutes of exercise per week or 10,000 steps per day on a pedometer . · Order or download the FREE \"Exercise & Physical Activity: Your Everyday Guide\" from [employer]. Call 7-938.305.2713 or search [employer] online. · You need 7649-7897 mg of calcium and 0519-3498 IU of vitamin D per day. It is possible to meet your calcium requirement with diet alone, but a vitamin D supplement is usually necessary to meet this goal.  · When exposed to the sun, use a sunscreen that protects against both UVA and UVB radiation with an SPF of 30 or greater. Reapply every 2 to 3 hours or after sweating, drying off with a towel, or swimming. · Always wear a seat belt when traveling in a car. Always wear a helmet when riding a bicycle or motorcycle.

## 2021-12-16 NOTE — PROGRESS NOTES
Medicare Annual Wellness Visit  Name: Judge Yee Date: 2021   MRN: [id number] Sex: Male   Age: 70 y.o. Ethnicity: Non- / Non    : 1950 Race: White (non-)      Rabia Bey is here for Medicare AWV    Screenings for behavioral, psychosocial and functional/safety risks, and cognitive dysfunction are all negative except as indicated below. These results, as well as other patient data from the [address] form, are documented in Flowsheets linked to this Encounter. No Known Allergies    Prior to Visit Medications    Medication Sig Taking?  Authorizing Provider   metoprolol succinate (TOPROL XL) 25 MG extended release tablet take 1 tablet by mouth once daily Yes Historical Provider, MD   diltiazem (CARDIZEM CD) 120 MG extended release capsule Take 1 capsule by mouth daily Yes Li Gorman MD   ticagrelor (BRILINTA) 60 MG TABS tablet Take 60 mg by mouth 2 times daily Yes Historical Provider, MD   aspirin 81 MG tablet Take 81 mg by mouth daily Yes Historical Provider, MD   atorvastatin (LIPITOR) 40 MG tablet Take 40 mg by mouth nightly  Yes Historical Provider, MD   nitroGLYCERIN (NITROSTAT) 0.4 MG SL tablet Place 1 tablet under the tongue every 5 minutes as needed for Chest pain Yes Sharad Jauregui MD       Past Medical History:   Diagnosis Date    Cancer [facility])     skin    Colon polyps       sheik    due  2018    Coronary artery disease 2016    stent  to lad  and  70% circ    H/O colonoscopy 2003    negative    Hyperlipidemia     Kidney stone     Loss of hearing     FABY (obstructive sleep apnea)     Osteoarthritis     PAF (paroxysmal atrial fibrillation) ([facility] [address]        Past Surgical History:   Procedure Laterality Date    CARPAL TUNNEL RELEASE      left     CHOLECYSTECTOMY, LAPAROSCOPIC  3/11    COCHLEAR IMPLANT Right 2017    at [facility]  dr Armida Loomis    COLONOSCOPY         sheik   colon polyps    CORONARY ANGIOPLASTY WITH STENT PLACEMENT  2016  may    stent  Lda  steph    JOINT REPLACEMENT      bilateral knees    KNEE ARTHROSCOPY  1998; 4/04    left:  Right 9/04    ROTATOR CUFF REPAIR  2004    right/ left     SKIN BIOPSY      SKIN CANCER EXCISION      left AC    TOTAL KNEE ARTHROPLASTY      left 10/06; right 6/08       Family History   Problem Relation Age of Onset    Heart Disease Father     High Blood Pressure Father        CareTeam (Including outside providers/suppliers regularly involved in providing care):   Patient Care Team:  Karin Bell MD as PCP - General (Family Medicine)  Karin Bell MD as PCP - [facility] Empaneled Provider    Wt Readings from Last 3 Encounters:   12/16/21 216 lb 8 oz (98.2 kg)   08/26/21 220 lb 8 oz (100 kg)   06/28/21 213 lb (96.6 kg)     Vitals:    12/16/21 1152   BP: 128/84   Site: Right Upper Arm   Position: Sitting   Cuff Size: Medium Adult   Pulse: 78   Resp: 14   Temp: 97 °F (36.1 °C)   TempSrc: Skin   Weight: 216 lb 8 oz (98.2 kg)   Height: 5' 7\" (1.702 m)     Body mass index is 33.91 kg/m². Colonoscopy  2019  Based upon direct observation of the patient, evaluation of cognition reveals recent and remote memory intact. Patient's complete Health Risk Assessment and screening values have been reviewed and are found in Flowsheets. The following problems were reviewed today and where indicated follow up appointments were made and/or referrals ordered. Positive Risk Factor Screenings with Interventions:      Cognitive: Words recalled: 0 Words Recalled  Clock Drawing Test (CDT) Score: Normal  Total Score Interpretation: Positive Mini-Cog  Cognitive Impairment Interventions:  ·  see  with  [address] and ACP:  General  In general, how would you say your health is?: Very Good  In the past 7 days, have you experienced any of the following?  New or Increased Pain, New or Increased Fatigue, Loneliness, Social Isolation, Stress or Anger?: None of These  Do you get the social and emotional support that you need?: Yes  Do you have a Living Will?: Yes  Advance Directives     Power of  Living Will ACP-Advance Directive ACP-Power of     Not on File Not on File Not on File Not on File      General Health Risk Interventions:  ·   stable    Health Habits/Nutrition:  Health Habits/Nutrition  Do you exercise for at least 20 minutes 2-3 times per week?: Yes  Have you lost any weight without trying in the past 3 months?: No  Do you eat only one meal per day?: No  Have you seen the dentist within the past year?: Yes  Body mass index: (!) 33.9  Health Habits/Nutrition Interventions:  ·    does  exercise     Hearing/Vision:  No exam data present  Hearing/Vision  Do you or your family notice any trouble with your hearing that hasn't been managed with hearing aids?: (!) Yes  Do you have difficulty driving, watching TV, or doing any of your daily activities because of your eyesight?: No  Have you had an eye exam within the past year?: Yes  Hearing/Vision Interventions:  · Hearing concerns:  audiology referral provided    Safety:  Safety  Do you have working smoke detectors?: Yes  Have all throw rugs been removed or fastened?: Yes  Do you have non-slip mats or surfaces in all bathtubs/showers?: (!) No  Do all of your stairways have a railing or banister?: Yes  Are your doorways, halls and stairs free of clutter?: Yes  Do you always fasten your seatbelt when you are in a car?: Yes  Safety Interventions:  · Home safety tips provided     Personalized Preventive Plan   Current Health Maintenance Status  Immunization History   Administered Date(s) Administered    Ana TAN April, Primary or Immunocompromised, PF, 100mcg/0.5mL 02/05/2021, 03/05/2021, 11/09/2021    Influenza Vaccine, unspecified formulation 11/16/2016    Influenza Virus Vaccine 10/22/2014, 09/22/2020    Influenza Whole 10/15/2015    Influenza, High Dose (Fluzone 65 yrs and older) 09/21/2018, 10/05/2019    Influenza, Quadv, adjuvanted, 65 yrs +, IM, PF (Fluad) 2021    Pneumococcal Conjugate 13-valent ([id number]) 2017, 2020    Pneumococcal Polysaccharide ([id number]) 04/15/2016, 2019    Tdap (Boostrix, Adacel) 2015, 2016    Zoster Recombinant (Shingrix) 2019, 06/15/2019        Health Maintenance   Topic Date Due    Annual Wellness Visit (AWV)  Never done    Lipid screen  2022    Colon cancer screen colonoscopy  01/15/2024    DTaP/Tdap/Td vaccine (3 - Td or Tdap) 2026    Flu vaccine  Completed    Shingles Vaccine  Completed    Pneumococcal 65+ years Vaccine  Completed    COVID-19 Vaccine  Completed    Hepatitis C screen  Completed    Hepatitis A vaccine  Aged Out    Hepatitis B vaccine  Aged Out    Hib vaccine  Aged Out    Meningococcal (ACWY) vaccine  Aged Out     Recommendations for [employer] Due: see orders and patient instructions/AVS.  . Recommended screening schedule for the next 5-10 years is provided to the patient in written form: see Patient Instructions/AVS.    There are no diagnoses linked to this encounter. Ruy Mcdaniel (:  1950) is a 70 y.o. male,Established patient, here for evaluation of the following chief complaint(s):  Medicare AWV         ASSESSMENT   Diagnosis Orders   1. PAF (paroxysmal atrial fibrillation) (HCC)  TSH with Reflex    CBC Auto Differential   2. Benign prostatic hyperplasia without lower urinary tract symptoms  PSA, Prostatic Specific Antigen   3. Polyp of colon, unspecified part of colon, unspecified type     4. Sensorineural hearing loss (SNHL) of both ears     5. Coronary artery disease involving native coronary artery of native heart without angina pectoris     6. Pure hypercholesterolemia     7.  ASNHL (asymmetrical sensorineural hearing loss)         Alfornia Orn:        Current Outpatient Medications   Medication Sig Dispense Refill    metoprolol succinate (TOPROL XL) 25 MG extended release tablet take 1 tablet by mouth once daily      diltiazem (CARDIZEM CD) 120 MG extended release capsule Take 1 capsule by mouth daily 30 capsule 3    ticagrelor (BRILINTA) 60 MG TABS tablet Take 60 mg by mouth 2 times daily      aspirin 81 MG tablet Take 81 mg by mouth daily      atorvastatin (LIPITOR) 40 MG tablet Take 40 mg by mouth nightly       nitroGLYCERIN (NITROSTAT) 0.4 MG SL tablet Place 1 tablet under the tongue every 5 minutes as needed for Chest pain 25 tablet 3     No current facility-administered medications for this visit. Orders Placed This Encounter   Procedures    TSH with Reflex     Standing Status:   Future     Standing Expiration Date:   12/16/2022    PSA, Prostatic Specific Antigen     Standing Status:   Future     Standing Expiration Date:   12/16/2022    CBC Auto Differential     Standing Status:   Future     Standing Expiration Date:   12/16/2022     No results found for this visit on 12/16/21. Subjective   SUBJECTIVE/OBJECTIVE:  HPI       Right    Hip  Slight  Pain        Arthritis   Stable      cochlera  Implant       Memory  Noted       cardiology  Noted   And  Post  Stent  See  Dr Ondina Ariza      short  Term  Memory      martir  Stable         Review of Systems   Constitutional: Negative for fatigue and fever. HENT: Negative for congestion, ear pain, postnasal drip, sore throat and trouble swallowing. Eyes: Negative for pain. Respiratory: Negative for cough, chest tightness and shortness of breath. Cardiovascular: Negative for chest pain, palpitations and leg swelling. Gastrointestinal: Negative for abdominal pain, blood in stool, constipation and nausea. Genitourinary: Negative for difficulty urinating, frequency and urgency. Musculoskeletal: Negative for arthralgias, back pain, joint swelling and neck stiffness. Skin: Negative for rash. Neurological: Negative for dizziness, weakness and headaches. Hematological: Negative for adenopathy.  Does not bruise/bleed easily. Psychiatric/Behavioral: Negative for behavioral problems, dysphoric mood and sleep disturbance. /84 (Site: Right Upper Arm, Position: Sitting, Cuff Size: Medium Adult)   Pulse 78   Temp 97 °F (36.1 °C) (Skin)   Resp 14   Ht 5' 7\" (1.702 m)   Wt 216 lb 8 oz (98.2 kg)   BMI 33.91 kg/m²   Objective   Physical Exam  Vitals and nursing note reviewed. Constitutional:       Appearance: He is well-developed. HENT:      Head: Normocephalic and atraumatic. Right Ear: External ear normal.      Left Ear: External ear normal.      Nose: Nose normal.   Eyes:      Conjunctiva/sclera: Conjunctivae normal.      Pupils: Pupils are equal, round, and reactive to light. Comments: Fundi nl   Neck:      Thyroid: No thyromegaly. Cardiovascular:      Rate and Rhythm: Normal rate and regular rhythm. Heart sounds: Normal heart sounds. Pulmonary:      Effort: Pulmonary effort is normal.      Breath sounds: Normal breath sounds. No wheezing or rales. Abdominal:      General: Bowel sounds are normal.      Palpations: Abdomen is soft. There is no mass. Tenderness: There is no abdominal tenderness. Musculoskeletal:         General: Normal range of motion. Cervical back: Normal range of motion and neck supple. Lymphadenopathy:      Cervical: No cervical adenopathy. Skin:     General: Skin is warm and dry. Findings: No rash. Neurological:      Mental Status: He is alert and oriented to person, place, and time. Cranial Nerves: No cranial nerve deficit. Deep Tendon Reflexes: Reflexes are normal and symmetric. An electronic signature was used to authenticate this note.     --Imani Pastrana MD

## 2021-12-21 ENCOUNTER — NURSE ONLY (OUTPATIENT)
Dept: LAB | Age: 71
End: 2021-12-21

## 2021-12-21 ENCOUNTER — TELEPHONE (OUTPATIENT)
Dept: FAMILY MEDICINE CLINIC | Age: 71
End: 2021-12-21

## 2021-12-21 DIAGNOSIS — N40.0 BENIGN PROSTATIC HYPERPLASIA WITHOUT LOWER URINARY TRACT SYMPTOMS: ICD-10-CM

## 2021-12-21 DIAGNOSIS — I48.0 PAF (PAROXYSMAL ATRIAL FIBRILLATION) (HCC): ICD-10-CM

## 2021-12-21 LAB
BASOPHILS # BLD: 0.8 %
BASOPHILS ABSOLUTE: 0.1 THOU/MM3 (ref 0–0.1)
EOSINOPHIL # BLD: 3.4 %
EOSINOPHILS ABSOLUTE: 0.3 THOU/MM3 (ref 0–0.4)
ERYTHROCYTE [DISTWIDTH] IN BLOOD BY AUTOMATED COUNT: 12.9 % (ref 11.5–14.5)
ERYTHROCYTE [DISTWIDTH] IN BLOOD BY AUTOMATED COUNT: 45.3 FL (ref 35–45)
HCT VFR BLD CALC: 50.9 % (ref 42–52)
HEMOGLOBIN: 16.4 GM/DL (ref 14–18)
IMMATURE GRANS (ABS): 0.04 THOU/MM3 (ref 0–0.07)
IMMATURE GRANULOCYTES: 0.5 %
LYMPHOCYTES # BLD: 22 %
LYMPHOCYTES ABSOLUTE: 1.7 THOU/MM3 (ref 1–4.8)
MCH RBC QN AUTO: 30.8 PG (ref 26–33)
MCHC RBC AUTO-ENTMCNC: 32.2 GM/DL (ref 32.2–35.5)
MCV RBC AUTO: 95.7 FL (ref 80–94)
MONOCYTES # BLD: 9.3 %
MONOCYTES ABSOLUTE: 0.7 THOU/MM3 (ref 0.4–1.3)
NUCLEATED RED BLOOD CELLS: 0 /100 WBC
PLATELET # BLD: 151 THOU/MM3 (ref 130–400)
PMV BLD AUTO: 10.7 FL (ref 9.4–12.4)
PROSTATE SPECIFIC ANTIGEN: 0.36 NG/ML (ref 0–1)
RBC # BLD: 5.32 MILL/MM3 (ref 4.7–6.1)
SEG NEUTROPHILS: 64 %
SEGMENTED NEUTROPHILS ABSOLUTE COUNT: 4.9 THOU/MM3 (ref 1.8–7.7)
TSH SERPL DL<=0.05 MIU/L-ACNC: 1.76 UIU/ML (ref 0.4–4.2)
WBC # BLD: 7.7 THOU/MM3 (ref 4.8–10.8)

## 2021-12-21 NOTE — TELEPHONE ENCOUNTER
Patient dropped off a Referral Form for Neri sure what actual Physician he would like to be referred to?      MOM to return call to office

## 2021-12-22 ENCOUNTER — TELEPHONE (OUTPATIENT)
Dept: FAMILY MEDICINE CLINIC | Age: 71
End: 2021-12-22

## 2021-12-22 NOTE — TELEPHONE ENCOUNTER
Wife called back and stated it was for Neurology Department for Cognitive Studies and Testing. Referral form filled out and faxed.

## 2022-04-08 VITALS
DIASTOLIC BLOOD PRESSURE: 81 MMHG | HEART RATE: 77 BPM | BODY MASS INDEX: 33.99 KG/M2 | SYSTOLIC BLOOD PRESSURE: 155 MMHG | WEIGHT: 217 LBS

## 2022-04-08 PROBLEM — Z96.21 COCHLEAR IMPLANT IN PLACE WITH MULTIPLE CHANNELS: Status: ACTIVE | Noted: 2018-03-27

## 2022-06-02 ENCOUNTER — HOSPITAL ENCOUNTER (OUTPATIENT)
Dept: NUCLEAR MEDICINE | Age: 72
Discharge: HOME | End: 2022-06-02
Payer: MEDICARE

## 2022-06-02 DIAGNOSIS — M16.0 PRIMARY OSTEOARTHRITIS OF BOTH HIPS: ICD-10-CM

## 2022-06-02 DIAGNOSIS — S76.311D RIGHT PROXIMAL HAMSTRING TENDON RUPTURE, SUBSEQUENT ENCOUNTER: ICD-10-CM

## 2022-06-02 DIAGNOSIS — M70.62 GREATER TROCHANTERIC BURSITIS OF BOTH HIPS: ICD-10-CM

## 2022-06-02 DIAGNOSIS — M70.61 GREATER TROCHANTERIC BURSITIS OF BOTH HIPS: ICD-10-CM

## 2022-06-02 PROCEDURE — A9503 TC99M MEDRONATE: HCPCS | Performed by: ORTHOPAEDIC SURGERY

## 2022-06-02 PROCEDURE — 78306 BONE IMAGING WHOLE BODY: CPT

## 2022-06-02 PROCEDURE — 3430000000 HC RX DIAGNOSTIC RADIOPHARMACEUTICAL: Performed by: ORTHOPAEDIC SURGERY

## 2022-06-02 RX ORDER — TC 99M MEDRONATE 20 MG/10ML
25.7 INJECTION, POWDER, LYOPHILIZED, FOR SOLUTION INTRAVENOUS
Status: COMPLETED | OUTPATIENT
Start: 2022-06-02 | End: 2022-06-02

## 2022-06-02 RX ADMIN — TC 99M MEDRONATE 25.7 MILLICURIE: 20 INJECTION, POWDER, LYOPHILIZED, FOR SOLUTION INTRAVENOUS at 12:30

## 2022-06-16 ENCOUNTER — OFFICE VISIT (OUTPATIENT)
Dept: FAMILY MEDICINE CLINIC | Age: 72
End: 2022-06-16

## 2022-06-16 VITALS
HEIGHT: 67 IN | DIASTOLIC BLOOD PRESSURE: 78 MMHG | WEIGHT: 211.13 LBS | SYSTOLIC BLOOD PRESSURE: 130 MMHG | BODY MASS INDEX: 33.14 KG/M2 | HEART RATE: 76 BPM | RESPIRATION RATE: 12 BRPM

## 2022-06-16 DIAGNOSIS — I25.10 CORONARY ARTERY DISEASE INVOLVING NATIVE CORONARY ARTERY OF NATIVE HEART WITHOUT ANGINA PECTORIS: ICD-10-CM

## 2022-06-16 DIAGNOSIS — Z96.21 COCHLEAR IMPLANT IN PLACE WITH MULTIPLE CHANNELS: ICD-10-CM

## 2022-06-16 DIAGNOSIS — E66.9 CLASS 1 OBESITY WITHOUT SERIOUS COMORBIDITY WITH BODY MASS INDEX (BMI) OF 32.0 TO 32.9 IN ADULT, UNSPECIFIED OBESITY TYPE: ICD-10-CM

## 2022-06-16 DIAGNOSIS — L57.0 ACTINIC KERATOSIS: ICD-10-CM

## 2022-06-16 DIAGNOSIS — M15.9 PRIMARY OSTEOARTHRITIS INVOLVING MULTIPLE JOINTS: ICD-10-CM

## 2022-06-16 DIAGNOSIS — I48.0 PAF (PAROXYSMAL ATRIAL FIBRILLATION) (HCC): Primary | ICD-10-CM

## 2022-06-16 DIAGNOSIS — E78.00 PURE HYPERCHOLESTEROLEMIA: ICD-10-CM

## 2022-06-16 PROCEDURE — 99213 OFFICE O/P EST LOW 20 MIN: CPT | Performed by: FAMILY MEDICINE

## 2022-06-16 PROCEDURE — 17000 DESTRUCT PREMALG LESION: CPT | Performed by: FAMILY MEDICINE

## 2022-06-16 PROCEDURE — 1123F ACP DISCUSS/DSCN MKR DOCD: CPT | Performed by: FAMILY MEDICINE

## 2022-06-16 PROCEDURE — 17003 DESTRUCT PREMALG LES 2-14: CPT | Performed by: FAMILY MEDICINE

## 2022-06-16 ASSESSMENT — ENCOUNTER SYMPTOMS
EYE PAIN: 0
BLOOD IN STOOL: 0
BACK PAIN: 0
TROUBLE SWALLOWING: 0
SHORTNESS OF BREATH: 0
SORE THROAT: 0
ABDOMINAL PAIN: 0
CHEST TIGHTNESS: 0
NAUSEA: 0
COUGH: 0
CONSTIPATION: 0

## 2022-06-16 ASSESSMENT — PATIENT HEALTH QUESTIONNAIRE - PHQ9
SUM OF ALL RESPONSES TO PHQ QUESTIONS 1-9: 0
SUM OF ALL RESPONSES TO PHQ QUESTIONS 1-9: 0
1. LITTLE INTEREST OR PLEASURE IN DOING THINGS: 0
SUM OF ALL RESPONSES TO PHQ QUESTIONS 1-9: 0
2. FEELING DOWN, DEPRESSED OR HOPELESS: 0
SUM OF ALL RESPONSES TO PHQ9 QUESTIONS 1 & 2: 0
SUM OF ALL RESPONSES TO PHQ QUESTIONS 1-9: 0

## 2022-06-16 NOTE — PROGRESS NOTES
Subjective:      Patient ID: Phuc Cunningham is a 70 y.o. male. ashd   Stable      left  Hip  In  Time      par  Atrial  Fib  Stable  Stable          Hyperlipidemia  This is a chronic problem. The problem is controlled. Recent lipid tests were reviewed and are normal. Pertinent negatives include no chest pain or shortness of breath. Current antihyperlipidemic treatment includes statins. The current treatment provides significant improvement of lipids. There are no compliance problems. Past Medical History:   Diagnosis Date    Cancer [facility])     skin    Colon polyps 2013      sheik    due  2018    Coronary artery disease 2016    stent  to lad  and  70% circ    H/O colonoscopy 2003    negative    Hyperlipidemia     Kidney stone 1994    Loss of hearing     FABY (obstructive sleep apnea)     Osteoarthritis     PAF (paroxysmal atrial fibrillation) (HCC)      Review of Systems   Constitutional: Negative for fatigue and fever. HENT: Negative for congestion, ear pain, postnasal drip, sore throat and trouble swallowing. Eyes: Negative for pain. Respiratory: Negative for cough, chest tightness and shortness of breath. Cardiovascular: Negative for chest pain, palpitations and leg swelling. Gastrointestinal: Negative for abdominal pain, blood in stool, constipation and nausea. Genitourinary: Negative for difficulty urinating, frequency and urgency. Musculoskeletal: Negative for arthralgias, back pain, joint swelling and neck stiffness. Skin: Negative for rash. Neurological: Negative for dizziness, weakness and headaches. Hematological: Negative for adenopathy. Does not bruise/bleed easily. Psychiatric/Behavioral: Negative for behavioral problems, dysphoric mood and sleep disturbance.      /78 (Site: Right Upper Arm, Position: Sitting, Cuff Size: Medium Adult)   Pulse 76   Resp 12   Ht 5' 7\" (1.702 m)   Wt 211 lb 2 oz (95.8 kg)   BMI 33.07 kg/m²   Objective:   Physical Exam  Vitals and nursing note reviewed. Constitutional:       Appearance: He is well-developed. HENT:      Head: Normocephalic and atraumatic. Right Ear: External ear normal.      Left Ear: External ear normal.      Nose: Nose normal.   Eyes:      Conjunctiva/sclera: Conjunctivae normal.      Pupils: Pupils are equal, round, and reactive to light. Comments: Fundi nl   Neck:      Thyroid: No thyromegaly. Cardiovascular:      Rate and Rhythm: Normal rate and regular rhythm. Heart sounds: Normal heart sounds. Pulmonary:      Effort: Pulmonary effort is normal.      Breath sounds: Normal breath sounds. No wheezing or rales. Abdominal:      General: Bowel sounds are normal.      Palpations: Abdomen is soft. There is no mass. Tenderness: There is no abdominal tenderness. Musculoskeletal:         General: Normal range of motion. Cervical back: Normal range of motion and neck supple. Lymphadenopathy:      Cervical: No cervical adenopathy. Skin:     General: Skin is warm and dry. Findings: No rash. Comments:  Right cheek  Actinic  Keratosis  4  Lesions  And  scalp  2  Lesions and  One  On  Each  Forearm    Neurological:      Mental Status: He is alert and oriented to person, place, and time. Cranial Nerves: No cranial nerve deficit. Deep Tendon Reflexes: Reflexes are normal and symmetric. Cryo    to  Skin    Right   cheek  4  Lesion and   2  Scalp and one  Each arm    Assessment:           Diagnosis Orders   1. PAF (paroxysmal atrial fibrillation) ([facility] [address]     2. Actinic keratosis  ID DESTRUC PREMALIGNANT, FIRST LESION    ID DESTRUC PREMALIGNANT,2-14 LESIONS   3. 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 Coronary artery disease involving native coronary artery of native heart without angina pectoris     5. Primary osteoarthritis involving multiple joints     6. Pure hypercholesterolemia     7.  Class 1 obesity without serious comorbidity with body mass index (BMI) of 32.0 to 32.9 in adult, unspecified obesity type       Plan:      Current Outpatient Medications   Medication Sig Dispense Refill    metoprolol succinate (TOPROL XL) 25 MG extended release tablet take 1 tablet by mouth once daily      diltiazem (CARDIZEM CD) 120 MG extended release capsule Take 1 capsule by mouth daily 30 capsule 3    ticagrelor (BRILINTA) 60 MG TABS tablet Take 60 mg by mouth 2 times daily      aspirin 81 MG tablet Take 81 mg by mouth daily      atorvastatin (LIPITOR) 40 MG tablet Take 40 mg by mouth nightly       nitroGLYCERIN (NITROSTAT) 0.4 MG SL tablet Place 1 tablet under the tongue every 5 minutes as needed for Chest pain 25 tablet 3     No current facility-administered medications for this visit. Orders Placed This Encounter   Procedures    LA DESTRUC PREMALIGNANT, FIRST LESION     After a verbal consent , the lesion was applied with liquid nitrogen thaw and freeze method and tolerated well the procedure      Right  Cheek lesion     [facility]     After a verbal consent , the lesion was applied with liquid nitrogen thaw and freeze method and tolerated well the procedure       One  Each arm and  2  Of  Scalp and  One  right  cheek     No results found for this visit on 06/16/22.        José Miguel Christianson MD

## 2022-06-20 RX ORDER — TICAGRELOR 60 MG/1
TABLET ORAL
Qty: 180 TABLET | OUTPATIENT
Start: 2022-06-20

## 2022-06-20 RX ORDER — DILTIAZEM HYDROCHLORIDE 120 MG/1
CAPSULE, COATED, EXTENDED RELEASE ORAL
Qty: 90 CAPSULE | OUTPATIENT
Start: 2022-06-20

## 2022-07-14 ENCOUNTER — NURSE ONLY (OUTPATIENT)
Dept: LAB | Age: 72
End: 2022-07-14

## 2022-07-14 LAB
FOLATE: 10.5 NG/ML (ref 4.8–24.2)
VITAMIN B-12: 423 PG/ML (ref 211–911)

## 2022-07-15 LAB — HOMOCYSTEINE: 17.3 UMOL/L

## 2022-07-18 LAB — METHYLMALONIC ACID: NORMAL

## 2022-07-28 ENCOUNTER — OFFICE VISIT (OUTPATIENT)
Dept: PULMONOLOGY | Age: 72
End: 2022-07-28
Payer: MEDICARE

## 2022-07-28 VITALS
WEIGHT: 207 LBS | SYSTOLIC BLOOD PRESSURE: 124 MMHG | TEMPERATURE: 98.1 F | HEART RATE: 69 BPM | OXYGEN SATURATION: 98 % | HEIGHT: 67 IN | DIASTOLIC BLOOD PRESSURE: 68 MMHG | BODY MASS INDEX: 32.49 KG/M2

## 2022-07-28 DIAGNOSIS — Z99.89 OSA ON CPAP: Primary | ICD-10-CM

## 2022-07-28 DIAGNOSIS — E66.9 OBESITY (BMI 30-39.9): ICD-10-CM

## 2022-07-28 DIAGNOSIS — G47.33 OSA ON CPAP: Primary | ICD-10-CM

## 2022-07-28 PROCEDURE — 1123F ACP DISCUSS/DSCN MKR DOCD: CPT | Performed by: PHYSICIAN ASSISTANT

## 2022-07-28 PROCEDURE — 99214 OFFICE O/P EST MOD 30 MIN: CPT | Performed by: PHYSICIAN ASSISTANT

## 2022-07-28 ASSESSMENT — ENCOUNTER SYMPTOMS
DIARRHEA: 0
COUGH: 0
WHEEZING: 0
STRIDOR: 0
CHEST TIGHTNESS: 0
SHORTNESS OF BREATH: 0
EYES NEGATIVE: 1
ALLERGIC/IMMUNOLOGIC NEGATIVE: 1
NAUSEA: 0
BACK PAIN: 0

## 2022-07-28 NOTE — PROGRESS NOTES
[facility]      [address] [place]         [id number]  7/28/2022   Chief Complaint   Patient presents with    Follow-up     1 year FABY follow up         Pt of Dr. Radha Garcia    PAP Download:   Original or initial AHI: 11.6     Date of initial study: 01/23/2019      Compliant  96.7%     Noncompliant 0 %     PAP Type Cpap   Level  8 cmH2O    Avg Hrs/Day 6 hours 57 minutes   AHI: 8   Recorded compliance dates , 06/27/2022 to 07/26/2022  Machine/Mfg:   [] ResMed    [x] Respironics/Dreamstation   Interface:   [x] Nasal    [] Nasal pillows   [] FFM      Provider:      [] -MATA     []Noah     [x] Aaron    [] [facility]    [] [place]               [] [facility]      [] Adaptive    [] [address]      [] Other    Neck Size: 18  Mallampati Mallampati 3  ESS:  0  SAQLI: 95    Here is a scan of the most recent download:                Presentation:   Monica Ramirez presents for sleep medicine follow up for obstructive sleep apnea  Since the last visit, Monica Ramirez is doing well and feels rested. AHI is elevated. Mask fits ok. Equipment issues: The pressure is  acceptable, the mask is acceptable     Sleep issues:  Do you feel better? Yes  More rested? Yes   Better concentration? yes    Progress History:   Since last visit any new medical issues? No  New ER or hospital visits? No  Any new or changes in medicines? No  Any new sleep medicines? No    Review of Systems -   Review of Systems   Constitutional:  Negative for activity change, appetite change, chills and fever. HENT:  Negative for congestion and postnasal drip. Eyes: Negative. Respiratory:  Negative for cough, chest tightness, shortness of breath, wheezing and stridor. Cardiovascular:  Negative for chest pain and leg swelling. Gastrointestinal:  Negative for diarrhea and nausea. Endocrine: Negative. Genitourinary: Negative. Musculoskeletal: Negative. Negative for arthralgias and back pain. Skin: Negative. Allergic/Immunologic: Negative. Neurological: Negative. Negative for dizziness and light-headedness. Psychiatric/Behavioral: Negative. All other systems reviewed and are negative. Physical Exam:    BMI:  Body mass index is 32.42 kg/m². Wt Readings from Last 3 Encounters:   07/28/22 207 lb (93.9 kg)   06/16/22 211 lb 2 oz (95.8 kg)   04/08/22 217 lb (98.4 kg)     Weight stable / unchanged  Vitals: /68   Pulse 69   Temp 98.1 °F (36.7 °C)   Ht 5' 7\" (1.702 m)   Wt 207 lb (93.9 kg)   SpO2 98% Comment: r/a  BMI 32.42 kg/m²       Physical Exam  Constitutional:       Appearance: Normal appearance. He is normal weight. HENT:      Head: Normocephalic and atraumatic. Right Ear: External ear normal.      Left Ear: External ear normal.      Nose: Nose normal.   Eyes:      Extraocular Movements: Extraocular movements intact. Conjunctiva/sclera: Conjunctivae normal.      Pupils: Pupils are equal, round, and reactive to light. Pulmonary:      Effort: Pulmonary effort is normal.   Musculoskeletal:      Cervical back: Normal range of motion and neck supple. Neurological:      General: No focal deficit present. Mental Status: He is alert and oriented to person, place, and time. Psychiatric:         Attention and Perception: Attention and perception normal.         Mood and Affect: Mood and affect normal.         Speech: Speech normal.         Behavior: Behavior normal. Behavior is cooperative. Thought Content: Thought content normal.         Cognition and Memory: Cognition normal.         Judgment: Judgment normal.       ASSESSMENT/DIAGNOSIS     Diagnosis Orders   1. FABY on CPAP  DME Order for CPAP as OP      2. Obesity (BMI 30-39. 9)                 Plan   Do you need any equipment today?  Yes update supplies  -increase pressure to 10 to correct AHI  - Download reviewed and discussed with patient  - He  was advised to continue current positive airway pressure therapy with above described pressure. - He  advised to keep good compliance with current recommended pressure to get optimal results and clinical improvement  - Recommend 7-9 hours of sleep with PAP  - He was advised to call [employer] company regarding supplies if needed.   -He call my office for earlier appointment if needed for worsening of sleep symptoms.   - He was instructed on weight loss  - Margoth Reasons was educated about my impression and plan. Patient verbalizesunderstanding.   We will see Angelita Valera back in: 1 year with download    Information added by my medical assistant/LPN was reviewed today         Shea Romano PA-C, MPAS  7/28/2022

## 2022-09-28 ENCOUNTER — OFFICE VISIT (OUTPATIENT)
Dept: CARDIOLOGY CLINIC | Age: 72
End: 2022-09-28
Payer: MEDICARE

## 2022-09-28 VITALS
HEART RATE: 62 BPM | WEIGHT: 210.4 LBS | SYSTOLIC BLOOD PRESSURE: 118 MMHG | HEIGHT: 67 IN | DIASTOLIC BLOOD PRESSURE: 62 MMHG | BODY MASS INDEX: 33.02 KG/M2 | RESPIRATION RATE: 18 BRPM

## 2022-09-28 DIAGNOSIS — I25.119 ATHEROSCLEROSIS OF NATIVE CORONARY ARTERY OF NATIVE HEART WITH ANGINA PECTORIS (HCC): ICD-10-CM

## 2022-09-28 DIAGNOSIS — I25.10 CORONARY ARTERY DISEASE INVOLVING NATIVE CORONARY ARTERY OF NATIVE HEART WITHOUT ANGINA PECTORIS: Primary | ICD-10-CM

## 2022-09-28 DIAGNOSIS — I20.9 ANGINA PECTORIS, UNSPECIFIED (HCC): ICD-10-CM

## 2022-09-28 PROCEDURE — 93000 ELECTROCARDIOGRAM COMPLETE: CPT | Performed by: INTERNAL MEDICINE

## 2022-09-28 PROCEDURE — 99213 OFFICE O/P EST LOW 20 MIN: CPT | Performed by: INTERNAL MEDICINE

## 2022-09-28 PROCEDURE — 1123F ACP DISCUSS/DSCN MKR DOCD: CPT | Performed by: INTERNAL MEDICINE

## 2022-09-28 RX ORDER — ATORVASTATIN CALCIUM 40 MG/1
40 TABLET, FILM COATED ORAL NIGHTLY
Qty: 90 TABLET | Refills: 3 | Status: SHIPPED | OUTPATIENT
Start: 2022-09-28

## 2022-09-28 RX ORDER — NITROGLYCERIN 0.4 MG/1
0.4 TABLET SUBLINGUAL EVERY 5 MIN PRN
Qty: 25 TABLET | Refills: 3 | Status: SHIPPED | OUTPATIENT
Start: 2022-09-28

## 2022-09-28 RX ORDER — METOPROLOL SUCCINATE 25 MG/1
TABLET, EXTENDED RELEASE ORAL
Qty: 90 TABLET | Refills: 3 | Status: SHIPPED | OUTPATIENT
Start: 2022-09-28

## 2022-09-28 RX ORDER — DONEPEZIL HYDROCHLORIDE 5 MG/1
TABLET, FILM COATED ORAL
COMMUNITY
Start: 2022-08-10

## 2022-09-28 RX ORDER — DILTIAZEM HYDROCHLORIDE 120 MG/1
120 CAPSULE, COATED, EXTENDED RELEASE ORAL DAILY
Qty: 90 CAPSULE | Refills: 3 | Status: SHIPPED | OUTPATIENT
Start: 2022-09-28

## 2022-09-28 ASSESSMENT — ENCOUNTER SYMPTOMS
ANAL BLEEDING: 0
COUGH: 0
BLOOD IN STOOL: 0
ABDOMINAL PAIN: 0
WHEEZING: 0
SHORTNESS OF BREATH: 0
STRIDOR: 0
CHEST TIGHTNESS: 0
CHOKING: 0
ABDOMINAL DISTENTION: 0
VOICE CHANGE: 0
COLOR CHANGE: 0
VOMITING: 0
NAUSEA: 0
APNEA: 0
TROUBLE SWALLOWING: 0

## 2022-09-28 NOTE — PROGRESS NOTES
[address]  Dept: [address]  Loc: [phone]     9/28/2022       Scott Cote is here today for   Chief Complaint   Patient presents with    Follow-up           Referring Physician:  No ref. provider found     Patient Active Problem List   Diagnosis    Hyperlipidemia    Osteoarthritis    Colon polyps    Obesity    ASNHL (asymmetrical sensorineural hearing loss)    Auditory discrimination impairment    Sensorineural hearing loss (SNHL) of both ears    Coronary artery disease    PAF (paroxysmal atrial fibrillation) (HCC)    Cochlear implant in place with multiple channels    Angina pectoris, unspecified    Atherosclerotic heart disease of native coronary artery with unspecified angina pectoris       Review of Systems   Constitutional:  Negative for activity change, appetite change, fatigue, fever and unexpected weight change. HENT:  Negative for congestion, trouble swallowing and voice change. Eyes:  Negative for visual disturbance. Respiratory:  Negative for apnea, cough, choking, chest tightness, shortness of breath, wheezing and stridor. Cardiovascular:  Negative for chest pain, palpitations and leg swelling. Gastrointestinal:  Negative for abdominal distention, abdominal pain, anal bleeding, blood in stool, nausea and vomiting. Endocrine: Negative for cold intolerance and heat intolerance. Genitourinary:  Negative for hematuria. Musculoskeletal:  Negative for arthralgias, gait problem, joint swelling and myalgias. Skin:  Negative for color change and rash. Allergic/Immunologic: Negative for environmental allergies and food allergies. Neurological:  Negative for dizziness, tremors, syncope, facial asymmetry, weakness, light-headedness, numbness and headaches. Hematological:  Does not bruise/bleed easily.    Psychiatric/Behavioral:  Negative for agitation, behavioral problems and sleep disturbance. Past Medical History:   Diagnosis Date    Cancer ([facility] [address]     skin    Colon polyps 2013      sheik    due  2018    Coronary artery disease 2016    stent  to lad  and  70% circ    H/O colonoscopy 2003    negative    Hyperlipidemia     Kidney stone 1994    Loss of hearing     FABY (obstructive sleep apnea)     Osteoarthritis     PAF (paroxysmal atrial fibrillation) (HCC)        No Known Allergies    Current Outpatient Medications   Medication Sig Dispense Refill    donepezil (ARICEPT) 5 MG tablet take 1 tablet by mouth once daily      cyanocobalamin 500 MCG tablet Take 500 mcg by mouth daily      metoprolol succinate (TOPROL XL) 25 MG extended release tablet take 1 tablet by mouth once daily 90 tablet 3    dilTIAZem (CARDIZEM CD) 120 MG extended release capsule Take 1 capsule by mouth daily 90 capsule 3    ticagrelor (BRILINTA) 60 MG TABS tablet Take 1 tablet by mouth 2 times daily 180 tablet 3    atorvastatin (LIPITOR) 40 MG tablet Take 1 tablet by mouth nightly 90 tablet 3    nitroGLYCERIN (NITROSTAT) 0.4 MG SL tablet Place 1 tablet under the tongue every 5 minutes as needed for Chest pain 25 tablet 3    CPAP Machine MISC by Does not apply route Please change CPAP pressure to 10 cm H20. 1 each 0    aspirin 81 MG tablet Take 81 mg by mouth daily       No current facility-administered medications for this visit. Social History     Socioeconomic History    Marital status:      Spouse name: Kinga Turk    Number of children: 3    Years of education: None    Highest education level: None   Tobacco Use    Smoking status: Never    Smokeless tobacco: Never   Substance and Sexual Activity    Alcohol use: Yes     Comment: 1-2 beers a day    Drug use: No    Sexual activity: Yes     Partners: Female       Family History   Problem Relation Age of Onset    Heart Disease Father     High Blood Pressure Father        Blood pressure 118/62, pulse 62, resp.  rate 18, height 5' 7\" (1.702 m), weight 210 lb 6.4 oz (95.4 kg). Physical Exam:    General Appearance: alert and oriented to person, place and time, in no acute distress  Cardiovascular: normal rate, regular rhythm, normal S1 and S2, no murmurs, rubs, clicks, or gallops, distal pulses intact, no carotid bruits, no JVD  Pulmonary/Chest: clear to auscultation bilaterally- no wheezes, rales or rhonchi, normal air movement, no respiratory distress  Abdomen: soft, non-tender, non-distended, normal bowel sounds, no masses   Extremities: no cyanosis, clubbing or edema, pulse   Skin: warm and dry, no rash or erythema  Head: normocephalic and atraumatic  Eyes: pupils equal, round, and reactive to light  Neck: supple and non-tender without mass, no thyromegaly   Musculoskeletal: normal range of motion, no joint swelling, deformity or tenderness  Neurological: alert, oriented, normal speech, no focal findings or movement disorder noted    Lab Data:    Cardiac Enzymes:  No results for input(s): CKTOTAL, CKMB, CKMBINDEX, TROPONINI in the last 72 hours.     CBC:   Lab Results   Component Value Date/Time    WBC 7.5 09/22/2022 08:52 AM    RBC 5.62 09/22/2022 08:52 AM    RBC 5.59 02/08/2017 07:30 AM    HGB 17.0 09/22/2022 08:52 AM    HCT 51.3 09/22/2022 08:52 AM     09/22/2022 08:52 AM       CMP:    Lab Results   Component Value Date/Time     09/22/2022 08:52 AM    K 4.3 09/22/2022 08:52 AM     09/22/2022 08:52 AM    CO2 26 09/22/2022 08:52 AM    BUN 15 09/22/2022 08:52 AM    CREATININE 0.9 09/22/2022 08:52 AM    LABGLOM 83 09/22/2022 08:52 AM    GLUCOSE 101 09/22/2022 08:52 AM    GLUCOSE 103 02/08/2017 07:30 AM    CALCIUM 9.6 09/22/2022 08:52 AM       Hepatic Function Panel:    Lab Results   Component Value Date/Time    ALKPHOS 95 09/22/2022 08:52 AM    ALT 19 09/22/2022 08:52 AM    AST 28 09/22/2022 08:52 AM    PROT 6.9 09/22/2022 08:52 AM    BILITOT 1.7 09/22/2022 08:52 AM    BILIDIR 0.3 09/22/2022 08:52 AM    LABALBU 4.2 09/22/2022 08:52 AM       Magnesium:    Lab Results   Component Value Date/Time    MG 2.2 09/24/2018 02:03 PM       PT/INR:    Lab Results   Component Value Date/Time    INR 1.03 04/14/2016 06:02 PM       HgBA1c:  No results found for: LABA1C    FLP:    Lab Results   Component Value Date/Time    TRIG 68 09/22/2022 08:52 AM    HDL 63 09/22/2022 08:52 AM    LDLCALC 59 09/22/2022 08:52 AM    LDLDIRECT 56.1 08/31/2016 07:07 AM       TSH:    Lab Results   Component Value Date/Time    TSH 1.760 12/21/2021 11:36 AM        Diagnosis Orders   1. Coronary artery disease involving native coronary artery of native heart without angina pectoris  28651 - [place] ELECTROCARDIOGRAM, COMPLETE      2. Angina pectoris, unspecified        3. Atherosclerosis of native coronary artery of native heart with angina pectoris [facility])             Assessment/Plan    Patient is a 67years old patient history of coronary artery disease prior intervention of the LAD at [facility] the circumflex and the RCA patient is here for a follow-up he indicates he is feeling okay the patient to have an EGD for esophageal stricture on the rotation as he had difficulty swallowing. He also rotator cuff syndrome. Considering surgery for that the patient thinking about it yet patient has a history of hypertension hypercholesterolemia all under control with the medication the his hemoglobin within normal limits his cholesterol 178 his blood sugar 104 his EKG showed sinus rhythm no acute changes he will continue current medication he will be seen periodically seek medical attention for any change in clinical condition thank    Orders Placed This Encounter   Procedures    63777 - [place] ELECTROCARDIOGRAM, COMPLETE       No follow-ups on file.      Yumi Norman MD

## 2022-09-28 NOTE — PROGRESS NOTES
[address]  Dept: [address]  Loc: [phone]     9/28/2022       Haroon Baxter is here today for   Chief Complaint   Patient presents with    Follow-up           Referring Physician:  No ref. provider found     Patient Active Problem List   Diagnosis    Hyperlipidemia    Osteoarthritis    Colon polyps    Obesity    ASNHL (asymmetrical sensorineural hearing loss)    Auditory discrimination impairment    Sensorineural hearing loss (SNHL) of both ears    Coronary artery disease    PAF (paroxysmal atrial fibrillation) (HCC)    Cochlear implant in place with multiple channels    Angina pectoris, unspecified    Atherosclerotic heart disease of native coronary artery with unspecified angina pectoris       Review of Systems   Constitutional:  Negative for activity change, appetite change, fatigue, fever and unexpected weight change. HENT:  Negative for congestion, trouble swallowing and voice change. Eyes:  Negative for visual disturbance. Respiratory:  Negative for apnea, cough, choking, chest tightness, shortness of breath, wheezing and stridor. Cardiovascular:  Negative for chest pain, palpitations and leg swelling. Gastrointestinal:  Negative for abdominal distention, abdominal pain, anal bleeding, blood in stool, nausea and vomiting. Endocrine: Negative for cold intolerance and heat intolerance. Genitourinary:  Negative for hematuria. Musculoskeletal:  Negative for arthralgias, gait problem, joint swelling and myalgias. Skin:  Negative for color change and rash. Allergic/Immunologic: Negative for environmental allergies and food allergies. Neurological:  Negative for dizziness, tremors, syncope, facial asymmetry, weakness, light-headedness, numbness and headaches. Hematological:  Does not bruise/bleed easily.    Psychiatric/Behavioral:  Negative for agitation, behavioral problems and sleep disturbance. Past Medical History:   Diagnosis Date    Cancer ([facility] [address]     skin    Colon polyps 2013      sheik    due  2018    Coronary artery disease 2016    stent  to lad  and  70% circ    H/O colonoscopy 2003    negative    Hyperlipidemia     Kidney stone 1994    Loss of hearing     FABY (obstructive sleep apnea)     Osteoarthritis     PAF (paroxysmal atrial fibrillation) (HCC)        No Known Allergies    Current Outpatient Medications   Medication Sig Dispense Refill    donepezil (ARICEPT) 5 MG tablet take 1 tablet by mouth once daily      cyanocobalamin 500 MCG tablet Take 500 mcg by mouth daily      metoprolol succinate (TOPROL XL) 25 MG extended release tablet take 1 tablet by mouth once daily 90 tablet 3    dilTIAZem (CARDIZEM CD) 120 MG extended release capsule Take 1 capsule by mouth daily 90 capsule 3    ticagrelor (BRILINTA) 60 MG TABS tablet Take 1 tablet by mouth 2 times daily 180 tablet 3    atorvastatin (LIPITOR) 40 MG tablet Take 1 tablet by mouth nightly 90 tablet 3    nitroGLYCERIN (NITROSTAT) 0.4 MG SL tablet Place 1 tablet under the tongue every 5 minutes as needed for Chest pain 25 tablet 3    CPAP Machine MISC by Does not apply route Please change CPAP pressure to 10 cm H20. 1 each 0    aspirin 81 MG tablet Take 81 mg by mouth daily       No current facility-administered medications for this visit. Social History     Socioeconomic History    Marital status:      Spouse name: John Ogden    Number of children: 3    Years of education: None    Highest education level: None   Tobacco Use    Smoking status: Never    Smokeless tobacco: Never   Substance and Sexual Activity    Alcohol use: Yes     Comment: 1-2 beers a day    Drug use: No    Sexual activity: Yes     Partners: Female       Family History   Problem Relation Age of Onset    Heart Disease Father     High Blood Pressure Father        Blood pressure 118/62, pulse 62, resp.  rate 18, height 5' 7\" (1.702 m), weight 210 lb 6.4 oz (95.4 kg). Physical Exam:    General Appearance: alert and oriented to person, place and time, in no acute distress  Cardiovascular: normal rate, regular rhythm, normal S1 and S2, no murmurs, rubs, clicks, or gallops, distal pulses intact, no carotid bruits, no JVD  Pulmonary/Chest: clear to auscultation bilaterally- no wheezes, rales or rhonchi, normal air movement, no respiratory distress  Abdomen: soft, non-tender, non-distended, normal bowel sounds, no masses   Extremities: no cyanosis, clubbing or edema, pulse   Skin: warm and dry, no rash or erythema  Head: normocephalic and atraumatic  Eyes: pupils equal, round, and reactive to light  Neck: supple and non-tender without mass, no thyromegaly   Musculoskeletal: normal range of motion, no joint swelling, deformity or tenderness  Neurological: alert, oriented, normal speech, no focal findings or movement disorder noted    Lab Data:    Cardiac Enzymes:  No results for input(s): CKTOTAL, CKMB, CKMBINDEX, TROPONINI in the last 72 hours.     CBC:   Lab Results   Component Value Date/Time    WBC 7.5 09/22/2022 08:52 AM    RBC 5.62 09/22/2022 08:52 AM    RBC 5.59 02/08/2017 07:30 AM    HGB 17.0 09/22/2022 08:52 AM    HCT 51.3 09/22/2022 08:52 AM     09/22/2022 08:52 AM       CMP:    Lab Results   Component Value Date/Time     09/22/2022 08:52 AM    K 4.3 09/22/2022 08:52 AM     09/22/2022 08:52 AM    CO2 26 09/22/2022 08:52 AM    BUN 15 09/22/2022 08:52 AM    CREATININE 0.9 09/22/2022 08:52 AM    LABGLOM 83 09/22/2022 08:52 AM    GLUCOSE 101 09/22/2022 08:52 AM    GLUCOSE 103 02/08/2017 07:30 AM    CALCIUM 9.6 09/22/2022 08:52 AM       Hepatic Function Panel:    Lab Results   Component Value Date/Time    ALKPHOS 95 09/22/2022 08:52 AM    ALT 19 09/22/2022 08:52 AM    AST 28 09/22/2022 08:52 AM    PROT 6.9 09/22/2022 08:52 AM    BILITOT 1.7 09/22/2022 08:52 AM    BILIDIR 0.3 09/22/2022 08:52 AM    LABALBU 4.2 09/22/2022 08:52 AM       Magnesium:    Lab Results   Component Value Date/Time    MG 2.2 09/24/2018 02:03 PM       PT/INR:    Lab Results   Component Value Date/Time    INR 1.03 04/14/2016 06:02 PM       HgBA1c:  No results found for: LABA1C    FLP:    Lab Results   Component Value Date/Time    TRIG 68 09/22/2022 08:52 AM    HDL 63 09/22/2022 08:52 AM    LDLCALC 59 09/22/2022 08:52 AM    LDLDIRECT 56.1 08/31/2016 07:07 AM       TSH:    Lab Results   Component Value Date/Time    TSH 1.760 12/21/2021 11:36 AM        Diagnosis Orders   1. Coronary artery disease involving native coronary artery of native heart without angina pectoris  92271 - [place] ELECTROCARDIOGRAM, COMPLETE      2. Angina pectoris, unspecified        3. Atherosclerosis of native coronary artery of native heart with angina pectoris [facility])             Assessment/Plan    Taniya Wilkerson is 67years old gentleman history of coronary artery disease back in 2016 had a stent to the LAD 2 years ago had a stress test was abnormal with a good exercise tolerance patient is here for a yearly visit he indicates he is feeling well he denied chest pain he has been physically active. He had history of hypercholesterolemia has been on the statin under well controlled the patient blood pressure has been under control he is tolerating medication okay the EKG finding the lab findings and the plan of treatment were discussed with him and his wife in great detail all his questions were answered to his satisfaction and he will be seen periodically    Orders Placed This Encounter   Procedures    73729 - [place] ELECTROCARDIOGRAM, COMPLETE       Return in about 6 months (around 3/28/2023) for cad.      Leatha Blair MD

## 2022-12-01 ENCOUNTER — HOSPITAL ENCOUNTER (OUTPATIENT)
Dept: INTERVENTIONAL RADIOLOGY/VASCULAR | Age: 72
Discharge: HOME | End: 2022-12-01
Payer: MEDICARE

## 2022-12-01 DIAGNOSIS — M16.0 PRIMARY OSTEOARTHRITIS OF BOTH HIPS: ICD-10-CM

## 2022-12-01 DIAGNOSIS — M70.61 GREATER TROCHANTERIC BURSITIS OF BOTH HIPS: ICD-10-CM

## 2022-12-01 DIAGNOSIS — S76.311D COMPLETE PROXIMAL HAMSTRING TENDON RUPTURE, RIGHT, SUBSEQUENT ENCOUNTER: ICD-10-CM

## 2022-12-01 DIAGNOSIS — M79.605 LEFT LEG PAIN: ICD-10-CM

## 2022-12-01 DIAGNOSIS — M70.62 GREATER TROCHANTERIC BURSITIS OF BOTH HIPS: ICD-10-CM

## 2022-12-01 PROCEDURE — 20610 DRAIN/INJ JOINT/BURSA W/O US: CPT

## 2022-12-01 PROCEDURE — 77002 NEEDLE LOCALIZATION BY XRAY: CPT

## 2022-12-01 PROCEDURE — 6360000004 HC RX CONTRAST MEDICATION: Performed by: RADIOLOGY

## 2022-12-01 PROCEDURE — 6360000002 HC RX W HCPCS: Performed by: RADIOLOGY

## 2022-12-01 PROCEDURE — 2500000003 HC RX 250 WO HCPCS: Performed by: RADIOLOGY

## 2022-12-01 RX ORDER — BUPIVACAINE HYDROCHLORIDE 2.5 MG/ML
5 INJECTION, SOLUTION EPIDURAL; INFILTRATION; INTRACAUDAL ONCE
Status: COMPLETED | OUTPATIENT
Start: 2022-12-01 | End: 2022-12-01

## 2022-12-01 RX ORDER — METHYLPREDNISOLONE ACETATE 80 MG/ML
80 INJECTION, SUSPENSION INTRA-ARTICULAR; INTRALESIONAL; INTRAMUSCULAR; SOFT TISSUE ONCE
Status: COMPLETED | OUTPATIENT
Start: 2022-12-01 | End: 2022-12-01

## 2022-12-01 RX ADMIN — IOTHALAMATE MEGLUMINE 1 ML: 600 INJECTION INTRAVASCULAR at 10:01

## 2022-12-01 RX ADMIN — BUPIVACAINE HYDROCHLORIDE 4 ML: 2.5 INJECTION, SOLUTION EPIDURAL; INFILTRATION; INTRACAUDAL; PERINEURAL at 10:02

## 2022-12-01 RX ADMIN — METHYLPREDNISOLONE ACETATE 80 MG: 80 INJECTION, SUSPENSION INTRA-ARTICULAR; INTRALESIONAL; INTRAMUSCULAR; SOFT TISSUE at 10:02

## 2022-12-01 ASSESSMENT — PAIN DESCRIPTION - ORIENTATION
ORIENTATION: LEFT
ORIENTATION: LEFT;OUTER

## 2022-12-01 ASSESSMENT — PAIN DESCRIPTION - LOCATION
LOCATION: HIP
LOCATION: HIP

## 2022-12-01 ASSESSMENT — PAIN DESCRIPTION - PAIN TYPE
TYPE: CHRONIC PAIN
TYPE: CHRONIC PAIN

## 2022-12-01 ASSESSMENT — PAIN DESCRIPTION - FREQUENCY
FREQUENCY: CONTINUOUS
FREQUENCY: CONTINUOUS

## 2022-12-01 ASSESSMENT — PAIN SCALES - GENERAL
PAINLEVEL_OUTOF10: 8
PAINLEVEL_OUTOF10: 2

## 2022-12-01 ASSESSMENT — PAIN DESCRIPTION - ONSET
ONSET: ON-GOING
ONSET: ON-GOING

## 2022-12-01 ASSESSMENT — PAIN - FUNCTIONAL ASSESSMENT
PAIN_FUNCTIONAL_ASSESSMENT: PREVENTS OR INTERFERES SOME ACTIVE ACTIVITIES AND ADLS
PAIN_FUNCTIONAL_ASSESSMENT: PREVENTS OR INTERFERES SOME ACTIVE ACTIVITIES AND ADLS

## 2022-12-01 ASSESSMENT — PAIN DESCRIPTION - DESCRIPTORS
DESCRIPTORS: ACHING
DESCRIPTORS: DISCOMFORT

## 2022-12-01 NOTE — OP NOTE
Department of Radiology  Post Procedure Progress Note    Pre-Procedure Diagnosis:  Hip pain    Procedure Performed:  Hip joint block/steroid injection      Anesthesia: local     Findings: successful    Immediate Complications:  None    Estimated Blood Loss: minimal    SEE DICTATED PROCEDURE NOTE FOR COMPLETE DETAILS.     Dulce Flores MD   12/1/2022 10:03 AM

## 2022-12-01 NOTE — PROGRESS NOTES
1994 Pt arrived to special procedure room 2 for right hip injection. Wife remains in the waiting room. Patient states all his pain is in his left hip, minimal pain in his right hip. Call placed to [facility] to clarify order. [address] call received from [facility]. Order changed to Left hip injection. 1157 Procedure explained using teach back method. Pt states understanding. Dr Dominick Alvarez into assess patient and explain procedure. This procedure has been fully reviewed with the patient and written informed consent has been obtained. 1511 Patient assisted to table in supine position. Comfort ensured. Pre-procedure images obtained. 5132 Procedure begins. 1002 Procedure complete. 1003 Patient assisted to seated position. Comfort ensured. 1005 Patient ambulated to discharge lobby in stable condition. Wife at side.

## 2022-12-22 ENCOUNTER — NURSE ONLY (OUTPATIENT)
Dept: LAB | Age: 72
End: 2022-12-22

## 2022-12-22 ENCOUNTER — OFFICE VISIT (OUTPATIENT)
Dept: FAMILY MEDICINE CLINIC | Age: 72
End: 2022-12-22

## 2022-12-22 VITALS
WEIGHT: 212.13 LBS | BODY MASS INDEX: 33.29 KG/M2 | DIASTOLIC BLOOD PRESSURE: 68 MMHG | HEIGHT: 67 IN | SYSTOLIC BLOOD PRESSURE: 110 MMHG | RESPIRATION RATE: 16 BRPM | HEART RATE: 64 BPM

## 2022-12-22 DIAGNOSIS — H90.3 SENSORINEURAL HEARING LOSS (SNHL) OF BOTH EARS: ICD-10-CM

## 2022-12-22 DIAGNOSIS — G30.9 ALZHEIMER'S DISEASE, UNSPECIFIED (CODE) (HCC): ICD-10-CM

## 2022-12-22 DIAGNOSIS — N40.0 BENIGN PROSTATIC HYPERPLASIA WITHOUT LOWER URINARY TRACT SYMPTOMS: ICD-10-CM

## 2022-12-22 DIAGNOSIS — I25.10 CORONARY ARTERY DISEASE INVOLVING NATIVE CORONARY ARTERY OF NATIVE HEART WITHOUT ANGINA PECTORIS: ICD-10-CM

## 2022-12-22 DIAGNOSIS — Z00.00 MEDICARE ANNUAL WELLNESS VISIT, SUBSEQUENT: Primary | ICD-10-CM

## 2022-12-22 DIAGNOSIS — E78.00 PURE HYPERCHOLESTEROLEMIA: ICD-10-CM

## 2022-12-22 DIAGNOSIS — I48.0 PAF (PAROXYSMAL ATRIAL FIBRILLATION) (HCC): ICD-10-CM

## 2022-12-22 DIAGNOSIS — K63.5 POLYP OF COLON, UNSPECIFIED PART OF COLON, UNSPECIFIED TYPE: ICD-10-CM

## 2022-12-22 LAB
PROSTATE SPECIFIC ANTIGEN: 0.44 NG/ML (ref 0–1)
TSH SERPL DL<=0.05 MIU/L-ACNC: 1.86 UIU/ML (ref 0.4–4.2)

## 2022-12-22 RX ORDER — DONEPEZIL HYDROCHLORIDE 10 MG/1
10 TABLET, FILM COATED ORAL NIGHTLY
Qty: 30 TABLET | Refills: 3
Start: 2022-12-22

## 2022-12-22 SDOH — ECONOMIC STABILITY: FOOD INSECURITY: WITHIN THE PAST 12 MONTHS, YOU WORRIED THAT YOUR FOOD WOULD RUN OUT BEFORE YOU GOT MONEY TO BUY MORE.: NEVER TRUE

## 2022-12-22 SDOH — ECONOMIC STABILITY: FOOD INSECURITY: WITHIN THE PAST 12 MONTHS, THE FOOD YOU BOUGHT JUST DIDN'T LAST AND YOU DIDN'T HAVE MONEY TO GET MORE.: NEVER TRUE

## 2022-12-22 ASSESSMENT — LIFESTYLE VARIABLES
HOW OFTEN DO YOU HAVE A DRINK CONTAINING ALCOHOL: 2-3 TIMES A WEEK
HOW MANY STANDARD DRINKS CONTAINING ALCOHOL DO YOU HAVE ON A TYPICAL DAY: 1 OR 2

## 2022-12-22 ASSESSMENT — PATIENT HEALTH QUESTIONNAIRE - PHQ9
SUM OF ALL RESPONSES TO PHQ QUESTIONS 1-9: 0
SUM OF ALL RESPONSES TO PHQ9 QUESTIONS 1 & 2: 0
SUM OF ALL RESPONSES TO PHQ QUESTIONS 1-9: 0
2. FEELING DOWN, DEPRESSED OR HOPELESS: 0
1. LITTLE INTEREST OR PLEASURE IN DOING THINGS: 0

## 2022-12-22 ASSESSMENT — ENCOUNTER SYMPTOMS
BLOOD IN STOOL: 0
COUGH: 0
SORE THROAT: 0
TROUBLE SWALLOWING: 0
BACK PAIN: 0
SHORTNESS OF BREATH: 0
CONSTIPATION: 0
NAUSEA: 0
CHEST TIGHTNESS: 0
ABDOMINAL PAIN: 0
EYE PAIN: 0

## 2022-12-22 ASSESSMENT — SOCIAL DETERMINANTS OF HEALTH (SDOH): HOW HARD IS IT FOR YOU TO PAY FOR THE VERY BASICS LIKE FOOD, HOUSING, MEDICAL CARE, AND HEATING?: NOT HARD AT ALL

## 2022-12-22 NOTE — PATIENT INSTRUCTIONS
Fatigue: Care Instructions  Your Care Instructions     Fatigue is a feeling of tiredness, exhaustion, or lack of energy. You may feel fatigue because of too much or not enough activity. It can also come from stress, lack of sleep, boredom, and poor diet. Many medical problems, such as viral infections, can cause fatigue. Emotional problems, especially depression, are often the cause of fatigue. Fatigue is most often a symptom of another problem. Treatment for fatigue depends on the cause. For example, if you have fatigue because you have a certain health problem, treating this problem also treats your fatigue. If depression or anxiety is the cause, treatment may help. Follow-up care is a key part of your treatment and safety. Be sure to make and go to all appointments, and call your doctor if you are having problems. It's also a good idea to know your test results and keep a list of the medicines you take. How can you care for yourself at home? Get regular exercise. But don't overdo it. Go back and forth between rest and exercise. Get plenty of rest.  Eat a healthy diet. Do not skip meals, especially breakfast.  Reduce your use of caffeine, tobacco, and alcohol. Caffeine is most often found in coffee, tea, cola drinks, and chocolate. Limit medicines that can cause fatigue. This includes tranquilizers and cold and allergy medicines. When should you call for help? Watch closely for changes in your health, and be sure to contact your doctor if:    You have new symptoms such as fever or a rash.     Your fatigue gets worse.     You have been feeling down, depressed, or hopeless. Or you may have lost interest in things that you usually enjoy.     You are not getting better as expected. Where can you learn more? Go to http://www.woods.com/ and enter S042 to learn more about \"Fatigue: Care Instructions. \"  Current as of: February 9, 2022               Content Version: 13.5  © 3703-8689 Healthwise, Incorporated. Care instructions adapted under license by [facility] ([facility]). If you have questions about a medical condition or this instruction, always ask your healthcare professional. [address] any warranty or liability for your use of this information. Learning About Vision Tests  What are vision tests? The four most common vision tests are visual acuity tests, refraction, visual field tests, and color vision tests. Visual acuity (sharpness) tests  These tests are used: To see if you need glasses or contact lenses. To monitor an eye problem. To check an eye injury. Visual acuity tests are done as part of routine exams. You may also have this test when you get your 's license or apply for some types of jobs. Visual field tests  These tests are used: To check for vision loss in any area of your range of vision. To screen for certain eye diseases. To look for nerve damage after a stroke, head injury, or other problem that could reduce blood flow to the brain. Refraction and color tests  A refraction test is done to find the right prescription for glasses and contact lenses. A color vision test is done to check for color blindness. Color vision is often tested as part of a routine exam. You may also have this test when you apply for a job where recognizing different colors is important, such as , electronics, or the [employer]. How are vision tests done? Visual acuity test   You cover one eye at a time. You read aloud from a wall chart across the room. You read aloud from a small card that you hold in your hand. Refraction   You look into a special device. The device puts lenses of different strengths in front of each eye to see how strong your glasses or contact lenses need to be. Visual field tests   Your doctor may have you look through special machines.   Or your doctor may simply have you stare straight ahead while they move a finger into and out of your field of vision. Color vision test   You look at pieces of printed test patterns in various colors. You say what number or symbol you see. Your doctor may have you trace the number or symbol using a pointer. How do these tests feel? There is very little chance of having a problem from this test. If dilating drops are used for a vision test, they may make the eyes sting and cause a medicine taste in the mouth. Follow-up care is a key part of your treatment and safety. Be sure to make and go to all appointments, and call your doctor if you are having problems. It's also a good idea to know your test results and keep a list of the medicines you take. Where can you learn more? Go to http://www.venegas.com/ and enter G551 to learn more about \"Learning About Vision Tests. \"  Current as of: October 12, 2022               Content Version: 13.5  © 2006-2022 [employer]. Care instructions adapted under license by [facility] ([facility]). If you have questions about a medical condition or this instruction, always ask your healthcare professional. [place] [address] any warranty or liability for your use of this information. Advance Directives: Care Instructions  Overview  An advance directive is a legal way to state your wishes at the end of your life. It tells your family and your doctor what to do if you can't say what you want. There are two main types of advance directives. You can change them any time your wishes change. Living will. This form tells your family and your doctor your wishes about life support and other treatment. The form is also called a declaration. Medical power of . This form lets you name a person to make treatment decisions for you when you can't speak for yourself. This person is called a health care agent (health care proxy, health care surrogate).  The form is also called a durable power of  for health care.  If you do not have an advance directive, decisions about your medical care may be made by a family member, or by a doctor or a  who doesn't know you. It may help to think of an advance directive as a gift to the people who care for you. If you have one, they won't have to make tough decisions by themselves. For more information, including forms for your state, see the [address] website (www.caringinfo.org/planning/advance-directives/). Follow-up care is a key part of your treatment and safety. Be sure to make and go to all appointments, and call your doctor if you are having problems. It's also a good idea to know your test results and keep a list of the medicines you take. What should you include in an advance directive? Many states have a unique advance directive form. (It may ask you to address specific issues.) Or you might use a universal form that's approved by many states. If your form doesn't tell you what to address, it may be hard to know what to include in your advance directive. Use the questions below to help you get started. Who do you want to make decisions about your medical care if you are not able to? What life-support measures do you want if you have a serious illness that gets worse over time or can't be cured? What are you most afraid of that might happen? (Maybe you're afraid of having pain, losing your independence, or being kept alive by machines.)  Where would you prefer to die? (Your home? A hospital? A nursing home?)  Do you want to donate your organs when you die? Do you want certain Gnosticism practices performed before you die? When should you call for help? Be sure to contact your doctor if you have any questions. Where can you learn more? Go to http://www.[employer]ch [employer].com/ and enter R264 to learn more about \"Advance Directives: Care Instructions. \"  Current as of: June 16, 2022               Content Version: 13.5  © 8522-4253 Healthwise, Incorporated. Care instructions adapted under license by [facility] ([facility]). If you have questions about a medical condition or this instruction, always ask your healthcare professional. [place] [address] any warranty or liability for your use of this information. Personalized Preventive Plan for Solis Gonzalez - 12/22/2022  Medicare offers a range of preventive health benefits. Some of the tests and screenings are paid in full while other may be subject to a deductible, co-insurance, and/or copay. Some of these benefits include a comprehensive review of your medical history including lifestyle, illnesses that may run in your family, and various assessments and screenings as appropriate. After reviewing your medical record and screening and assessments performed today your provider may have ordered immunizations, labs, imaging, and/or referrals for you. A list of these orders (if applicable) as well as your Preventive Care list are included within your After Visit Summary for your review. Other Preventive Recommendations:    A preventive eye exam performed by an eye specialist is recommended every 1-2 years to screen for glaucoma; cataracts, macular degeneration, and other eye disorders. A preventive dental visit is recommended every 6 months. Try to get at least 150 minutes of exercise per week or 10,000 steps per day on a pedometer . Order or download the FREE \"Exercise & Physical Activity: Your Everyday Guide\" from [employer]. Call 5-289.964.9992 or search [employer] online. You need 6077-0552 mg of calcium and 2485-7272 IU of vitamin D per day. It is possible to meet your calcium requirement with diet alone, but a vitamin D supplement is usually necessary to meet this goal.  When exposed to the sun, use a sunscreen that protects against both UVA and UVB radiation with an SPF of 30 or greater.  Reapply every 2 to 3 hours or after sweating, drying off with a towel, or swimming. Always wear a seat belt when traveling in a car. Always wear a helmet when riding a bicycle or motorcycle.

## 2022-12-23 ENCOUNTER — TELEPHONE (OUTPATIENT)
Dept: FAMILY MEDICINE CLINIC | Age: 72
End: 2022-12-23

## 2022-12-23 NOTE — TELEPHONE ENCOUNTER
----- Message from Tiffany Smith MD sent at 12/23/2022  6:30 AM EST -----  Call as psa and tsh  both  normal

## 2023-01-24 ENCOUNTER — TELEPHONE (OUTPATIENT)
Dept: CARDIOLOGY CLINIC | Age: 73
End: 2023-01-24

## 2023-01-24 DIAGNOSIS — R94.31 ABNORMAL ELECTROCARDIOGRAM: Primary | ICD-10-CM

## 2023-02-09 ENCOUNTER — HOSPITAL ENCOUNTER (OUTPATIENT)
Dept: NON INVASIVE DIAGNOSTICS | Age: 73
Discharge: HOME | End: 2023-02-09
Payer: MEDICARE

## 2023-02-09 VITALS — HEIGHT: 67 IN | WEIGHT: 215 LBS | BODY MASS INDEX: 33.74 KG/M2

## 2023-02-09 DIAGNOSIS — R94.31 ABNORMAL ELECTROCARDIOGRAM: ICD-10-CM

## 2023-02-09 PROCEDURE — 78452 HT MUSCLE IMAGE SPECT MULT: CPT | Performed by: NUCLEAR MEDICINE

## 2023-02-09 PROCEDURE — 93017 CV STRESS TEST TRACING ONLY: CPT | Performed by: NUCLEAR MEDICINE

## 2023-02-09 PROCEDURE — 3430000000 HC RX DIAGNOSTIC RADIOPHARMACEUTICAL: Performed by: INTERNAL MEDICINE

## 2023-02-09 PROCEDURE — A9500 TC99M SESTAMIBI: HCPCS | Performed by: INTERNAL MEDICINE

## 2023-02-09 PROCEDURE — 6360000002 HC RX W HCPCS

## 2023-02-09 RX ORDER — TECHNETIUM TC-99M SESTAMIBI 1 MG/10ML
35 INJECTION INTRAVENOUS
Status: COMPLETED | OUTPATIENT
Start: 2023-02-09 | End: 2023-02-09

## 2023-02-09 RX ORDER — TECHNETIUM TC-99M SESTAMIBI 1 MG/10ML
10.2 INJECTION INTRAVENOUS
Status: COMPLETED | OUTPATIENT
Start: 2023-02-09 | End: 2023-02-09

## 2023-02-09 RX ADMIN — Medication 35 MILLICURIE: at 09:24

## 2023-02-09 RX ADMIN — Medication 10.2 MILLICURIE: at 08:32

## 2023-03-14 ENCOUNTER — OFFICE VISIT (OUTPATIENT)
Dept: CARDIOLOGY CLINIC | Age: 73
End: 2023-03-14
Payer: MEDICARE

## 2023-03-14 VITALS
HEART RATE: 68 BPM | DIASTOLIC BLOOD PRESSURE: 86 MMHG | SYSTOLIC BLOOD PRESSURE: 151 MMHG | WEIGHT: 209 LBS | HEIGHT: 67 IN | BODY MASS INDEX: 32.8 KG/M2 | RESPIRATION RATE: 18 BRPM

## 2023-03-14 DIAGNOSIS — E78.5 HYPERLIPIDEMIA LDL GOAL <70: ICD-10-CM

## 2023-03-14 DIAGNOSIS — I48.0 PAF (PAROXYSMAL ATRIAL FIBRILLATION) (HCC): ICD-10-CM

## 2023-03-14 DIAGNOSIS — I25.119 ATHEROSCLEROSIS OF NATIVE CORONARY ARTERY OF NATIVE HEART WITH ANGINA PECTORIS (HCC): ICD-10-CM

## 2023-03-14 DIAGNOSIS — I10 PRIMARY HYPERTENSION: ICD-10-CM

## 2023-03-14 DIAGNOSIS — I25.10 CORONARY ARTERY DISEASE INVOLVING NATIVE CORONARY ARTERY OF NATIVE HEART WITHOUT ANGINA PECTORIS: Primary | ICD-10-CM

## 2023-03-14 PROCEDURE — 99214 OFFICE O/P EST MOD 30 MIN: CPT | Performed by: NURSE PRACTITIONER

## 2023-03-14 PROCEDURE — 3077F SYST BP >= 140 MM HG: CPT | Performed by: NURSE PRACTITIONER

## 2023-03-14 PROCEDURE — 3079F DIAST BP 80-89 MM HG: CPT | Performed by: NURSE PRACTITIONER

## 2023-03-14 PROCEDURE — 93000 ELECTROCARDIOGRAM COMPLETE: CPT | Performed by: INTERNAL MEDICINE

## 2023-03-14 PROCEDURE — 1123F ACP DISCUSS/DSCN MKR DOCD: CPT | Performed by: NURSE PRACTITIONER

## 2023-03-14 ASSESSMENT — ENCOUNTER SYMPTOMS
GASTROINTESTINAL NEGATIVE: 1
RESPIRATORY NEGATIVE: 1

## 2023-03-14 NOTE — PROGRESS NOTES
[address] [address]  Dept: [address]: [phone]           Chief Complaint   Patient presents with    Follow-up       Cardiologist:  Dr. Argentina Rice      Today's visit: 3/14/2023    Subjective:    Review of Systems   Constitutional: Negative. Respiratory: Negative. Cardiovascular: Negative. Gastrointestinal: Negative. Musculoskeletal: Negative. Neurological: Negative. Psychiatric/Behavioral: Negative. Past Medical History:   Diagnosis Date    Cancer ([facility] [address]     skin    Colon polyps 2013      sheik    due  2018    Coronary artery disease 2016    stent  to lad  and  70% circ    H/O colonoscopy 2003    negative    Hyperlipidemia     Kidney stone 1994    Loss of hearing     FABY (obstructive sleep apnea)     Osteoarthritis     PAF (paroxysmal atrial fibrillation) (HCC)        No Known Allergies    Current Outpatient Medications   Medication Sig Dispense Refill    donepezil (ARICEPT) 10 MG tablet Take 1 tablet by mouth nightly 30 tablet 3    cyanocobalamin 500 MCG tablet Take 500 mcg by mouth daily      metoprolol succinate (TOPROL XL) 25 MG extended release tablet take 1 tablet by mouth once daily 90 tablet 3    dilTIAZem (CARDIZEM CD) 120 MG extended release capsule Take 1 capsule by mouth daily 90 capsule 3    ticagrelor (BRILINTA) 60 MG TABS tablet Take 1 tablet by mouth 2 times daily 180 tablet 3    atorvastatin (LIPITOR) 40 MG tablet Take 1 tablet by mouth nightly 90 tablet 3    nitroGLYCERIN (NITROSTAT) 0.4 MG SL tablet Place 1 tablet under the tongue every 5 minutes as needed for Chest pain 25 tablet 3    CPAP Machine MISC by Does not apply route Please change CPAP pressure to 10 cm H20. 1 each 0    aspirin 81 MG tablet Take 81 mg by mouth daily       No current facility-administered medications for this visit.        Social History     Socioeconomic History Marital status:      Spouse name: Berenice    Number of children: 3    Years of education: None    Highest education level: None   Tobacco Use    Smoking status: Never    Smokeless tobacco: Never   Substance and Sexual Activity    Alcohol use: Yes     Comment: 1-2 beers a day    Drug use: No    Sexual activity: Yes     Partners: Female     Social Determinants of Health     Financial Resource Strain: Low Risk     Difficulty of Paying Living Expenses: Not hard at all   Food Insecurity: No Food Insecurity    Worried About Running Out of Food in the Last Year: Never true    Ran Out of Food in the Last Year: Never true   Physical Activity: Sufficiently Active    Days of Exercise per Week: 5 days    Minutes of Exercise per Session: 60 min       Family History   Problem Relation Age of Onset    Heart Disease Father     High Blood Pressure Father        Blood pressure (!) 151/86, pulse 68, resp. rate 18, height 5' 7\" (1.702 m), weight 209 lb (94.8 kg).      Physical Exam  Constitutional:       Appearance: Normal appearance.   Cardiovascular:      Rate and Rhythm: Normal rate and regular rhythm.      Heart sounds: Normal heart sounds.   Pulmonary:      Effort: Pulmonary effort is normal.      Breath sounds: Normal breath sounds.   Abdominal:      General: Bowel sounds are normal.      Palpations: Abdomen is soft.   Musculoskeletal:         General: Normal range of motion.   Skin:     General: Skin is warm and dry.   Neurological:      General: No focal deficit present.      Mental Status: He is alert and oriented to person, place, and time.   Psychiatric:         Mood and Affect: Mood normal.         Behavior: Behavior normal.         EKG: normal sinus rhythm, nonspecific ST and T waves changes, unchanged from previous tracings.      Diagnosis Orders   1. Coronary artery disease involving native coronary artery of native heart without angina pectoris  98915 - CT ELECTROCARDIOGRAM, COMPLETE    Lipid Panel    Hepatic  Function Panel    Basic Metabolic Panel    CBC      2. Primary hypertension        3. PAF (paroxysmal atrial fibrillation) ([facility] [address]        4. Atherosclerosis of native coronary artery of native heart with angina pectoris ([address]        5. Hyperlipidemia LDL goal <70              Assessment and Plan:  Dudley Pryor is a pleasant 70-year-old gentleman who presents today for a follow-up. Patient was planning on having hip surgery. He had a recent stress test which was within normal limits. He has been cleared from Dr. Vale Holcomb for surgery. His exam today is by me he states he is doing well with his medications he has no cardiac symptoms. He was accompanied today by his wife. All their questions were answered      Coronary artery disease status post LAD stenting    Hypertension  Hyperlipidemia      Dudley Pryor will see us back in 1 years time or sooner if he should need anything from us. I did discuss with his wife today watching his blood pressure. Ideally we want it to be less than 150      Orders Placed This Encounter   Procedures    Lipid Panel     Standing Status:   Future     Standing Expiration Date:   3/14/2024     Order Specific Question:   Is Patient Fasting?/# of Hours     Answer:   15     Order Specific Question:   Has the patient fasted? Answer:   Yes    Hepatic Function Panel     Standing Status:   Future     Standing Expiration Date:   2/37/6110    Basic Metabolic Panel     Standing Status:   Future     Standing Expiration Date:   3/14/2024    CBC     Standing Status:   Future     Standing Expiration Date:   3/14/2024    [address]15 - CT ELECTROCARDIOGRAM, COMPLETE     Continue Dr Bal Griffin current treatment plan    There are no Patient Instructions on file for this visit. Return in about 1 year (around 3/14/2024), or if symptoms worsen or fail to improve, for CAD.     Follow up with Dr Vale Holcomb as scheduled or sooner if needed    Angelia Tadeo, APRN - CNP

## 2023-03-27 ENCOUNTER — HOSPITAL ENCOUNTER (OUTPATIENT)
Dept: PHYSICAL THERAPY | Age: 73
Setting detail: THERAPIES SERIES
Discharge: HOME | End: 2023-03-27
Payer: MEDICARE

## 2023-03-27 PROCEDURE — 97110 THERAPEUTIC EXERCISES: CPT

## 2023-03-27 PROCEDURE — 97161 PT EVAL LOW COMPLEX 20 MIN: CPT

## 2023-03-27 ASSESSMENT — HOOS JR
LYING IN BED (TURNING OVER, MAINTAINING HIP POSITION): 2
RISING FROM SITTING: 3
HOOS JR RAW SCORE: 12
SITTING: 1
HOOS JR TOTAL INTERVAL SCORE: 52.965
HOOS JR RAW SCORE: 12
WALKING ON UNEVEN SURFACE: 2
GOING UP OR DOWN STAIRS: 2
BENDING TO THE FLOOR TO PICK UP OBJECT: 2

## 2023-03-27 NOTE — PROGRESS NOTES
[address] [facility]  PHYSICAL THERAPY  [x] EVALUATION  [] DAILY NOTE (LAND) [] DAILY NOTE (AQUATIC ) [] PROGRESS NOTE [] DISCHARGE NOTE    [x] OUTPATIENT REHABILITATION CENTER [facility]   [] [place] [address]    [] [facility]   [] Dmitri Hazard    Date: 3/27/2023  Patient Name:  Dane Laurent  : 1950  MRN: [id number]  CSN: [id number]    Referring Practitioner Juan J Vallecillo MD   Diagnosis Unilateral primary osteoarthritis, left hip [M16.12]    Treatment Diagnosis Pain in L hip, decreased L hip ROM, decreased L LE strength, impaired balance, abnormal gait    Date of Evaluation 3/27/23    Additional Pertinent History B TKA, B rotator cuff surgery, L KAYLI      Functional Outcome Measure Used HOOS    Functional Outcome Score  (3/27/23)       Insurance: Primary: Payor: Vida Wells /  /  / ,   Secondary:    Authorization Information: PRE CERTIFICATION REQUIRED: NO  INSURANCE THERAPY BENEFIT:  UNLIMITED VISITS BASED ON MEDICAL NECESSITY   AQUATIC THERAPY COVERED: YES  MODALITIES COVERED:  YES  TELEHEALTH COVERED: YES   Visit # 1, 1/10 for progress note   Visits Allowed: Unlimited based on medical necessity    Recertification Date: 3/81/97   Physician Follow-Up: 2 weeks   Physician Orders:    History of Present Illness: Patient had L KAYLI on 3/22/23. Patient reports that he had arthritis and pain in L hip and that lead to L KAYLI. Patient denies any complications with surgery. Patient reports that his incision is over lateral hip. Patient reports that he typically does not use AD for gait. Patient is currently using a standard walker. Patient does have a straight cane available at home. SUBJECTIVE: Patient was given exercises for at home and has been working on them. Patient has used ice on his hip. Patient is wearing B compression stockings. Patient did see MD a few days ago.  Patient     Social/Functional History and Current Status:  Medications and Allergies

## 2023-03-29 ENCOUNTER — HOSPITAL ENCOUNTER (OUTPATIENT)
Dept: PHYSICAL THERAPY | Age: 73
Setting detail: THERAPIES SERIES
Discharge: HOME | End: 2023-03-29
Payer: MEDICARE

## 2023-03-29 PROCEDURE — 97110 THERAPEUTIC EXERCISES: CPT

## 2023-03-29 NOTE — PROGRESS NOTES
[address] [facility]  PHYSICAL THERAPY  [] EVALUATION  [x] DAILY NOTE (LAND) [] DAILY NOTE (AQUATIC ) [] PROGRESS NOTE [] DISCHARGE NOTE    [x] OUTPATIENT REHABILITATION CENTER [facility]   [] [place] [address]    [] [facility]   [] Roshan Shearchrist    Date: 3/29/2023  Patient Name:  Etienne Daugherty  : 1950  MRN: [id number]  CSN: [id number]    Referring Practitioner Seymour Holland MD   Diagnosis LEFT HIP    Treatment Diagnosis Pain in L hip, decreased L hip ROM, decreased L LE strength, impaired balance, abnormal gait    Date of Evaluation 3/27/23    Additional Pertinent History B TKA, B rotator cuff surgery, L KAYLI      Functional Outcome Measure Used HOOS    Functional Outcome Score  (3/27/23)       Insurance: Primary: Payor: Valentin Segundo /  /  / ,   Secondary:    Authorization Information: PRE CERTIFICATION REQUIRED: NO  INSURANCE THERAPY BENEFIT:  UNLIMITED VISITS BASED ON MEDICAL NECESSITY   AQUATIC THERAPY COVERED: YES  MODALITIES COVERED:  YES  TELEHEALTH COVERED: YES   Visit # 2, 2/10 for progress note   Visits Allowed: Unlimited based on medical necessity    Recertification Date: 88   Physician Follow-Up: 2 weeks   Physician Orders:    History of Present Illness: Patient had L KAYLI on 3/22/23. Patient reports that he had arthritis and pain in L hip and that lead to L KAYLI. Patient denies any complications with surgery. Patient reports that his incision is over lateral hip. Patient reports that he typically does not use AD for gait. Patient is currently using a standard walker. Patient does have a straight cane available at home. SUBJECTIVE: Patient c/o mild hip pain today. Pt presents with SW. Pt reports he has been doing HEP 2-3x/day.      OBJECTIVE:  TREATMENT   Precautions: 50% WB L LE until 23, no hip flexion >90 deg, no hip adduction ROM   Pain: 3/10 pain L hip    \"X in shaded column indicates activity completed today    *\" next to

## 2023-03-31 ENCOUNTER — HOSPITAL ENCOUNTER (OUTPATIENT)
Dept: PHYSICAL THERAPY | Age: 73
Setting detail: THERAPIES SERIES
Discharge: HOME | End: 2023-03-31
Payer: MEDICARE

## 2023-03-31 PROCEDURE — 97110 THERAPEUTIC EXERCISES: CPT

## 2023-03-31 NOTE — PROGRESS NOTES
patient to get to his bedroom. Long Term Goals: 8 weeks  Patient will be independent with HEP in order to prevent re-injury and improve functional abilities. 2. Patient will improve HOOS Jr score from 12/24 to 5/24 to allow improved functional mobility and ease of recreational activities. 3. Patient will perform L SLS for 10 seconds to improve balance needed for stair negotiation. Patient Education:   [x]  HEP/Education Completed: stair negotiation, exercise technique  Medbridge Access Code:  []  No new Education completed  [x]  Reviewed Prior HEP      [x]  Patient verbalized and/or demonstrated understanding of education provided. []  Patient unable to verbalize and/or demonstrate understanding of education provided. Will continue education. []  Barriers to learning:     PLAN:  Treatment Recommendations: Strengthening, Range of Motion, Balance Training, Transfer Training, Endurance Training, Gait Training, Stair Training, Neuromuscular Re-education, Manual Therapy - Soft Tissue Mobilization, Manual Therapy - Joint Manipulation, Pain Management, Home Exercise Program, Patient Education, Aquatics, and Modalities    []  Plan of care initiated. Plan to see patient 2-3 times per week for 8 weeks to address the treatment planned outlined above.   [x]  Continue with current plan of care  []  Modify plan of care as follows:    []  Hold pending physician visit  []  Discharge    Time In 1358   Time Out 1424   Timed Code Minutes: 26   Total Treatment Time: 26     Electronically Signed by: [place] PRECIOUS Price

## 2023-04-03 ENCOUNTER — HOSPITAL ENCOUNTER (OUTPATIENT)
Dept: PHYSICAL THERAPY | Age: 73
Setting detail: THERAPIES SERIES
Discharge: HOME | End: 2023-04-03
Payer: MEDICARE

## 2023-04-03 PROCEDURE — 97110 THERAPEUTIC EXERCISES: CPT

## 2023-04-03 NOTE — PROGRESS NOTES
[address] [facility]  PHYSICAL THERAPY  [] EVALUATION  [x] DAILY NOTE (LAND) [] DAILY NOTE (AQUATIC ) [] PROGRESS NOTE [] DISCHARGE NOTE    [x] OUTPATIENT REHABILITATION CENTER [facility]   [] [place] [address]    [] [facility]   [] Kuldeep Mckoy    Date: 4/3/2023  Patient Name:  Cyndy Zamudio  : 1950  MRN: [id number]  CSN: [id number]    Referring Practitioner Keri Ocampo MD   Diagnosis LEFT HIP    Treatment Diagnosis Pain in L hip, decreased L hip ROM, decreased L LE strength, impaired balance, abnormal gait    Date of Evaluation 3/27/23    Additional Pertinent History B TKA, B rotator cuff surgery, L KAYLI      Functional Outcome Measure Used HOOS    Functional Outcome Score  (3/27/23)       Insurance: Primary: Payor: Dhruv Ponce /  /  / ,   Secondary:    Authorization Information: PRE CERTIFICATION REQUIRED: NO  INSURANCE THERAPY BENEFIT:  UNLIMITED VISITS BASED ON MEDICAL NECESSITY   AQUATIC THERAPY COVERED: YES  MODALITIES COVERED:  YES  TELEHEALTH COVERED: YES   Visit # 4, 4/10 for progress note   Visits Allowed: Unlimited based on medical necessity    Recertification Date:    Physician Follow-Up: 2 weeks   Physician Orders:    History of Present Illness: Patient had L KAYLI on 3/22/23. Patient reports that he had arthritis and pain in L hip and that lead to L KAYLI. Patient denies any complications with surgery. Patient reports that his incision is over lateral hip. Patient reports that he typically does not use AD for gait. Patient is currently using a standard walker. Patient does have a straight cane available at home. SUBJECTIVE: Pt presents with SW. States having anterior groin pain and rates it 5/10 pain. States getting off the floor or out of bed flares it up.  States it hurts more when he pulls his knee in towards his chest.     OBJECTIVE:  TREATMENT   Precautions: 50% WB L LE until 23, no hip flexion >90 deg, no hip adduction ROM

## 2023-04-05 ENCOUNTER — HOSPITAL ENCOUNTER (OUTPATIENT)
Dept: PHYSICAL THERAPY | Age: 73
Setting detail: THERAPIES SERIES
Discharge: HOME | End: 2023-04-05
Payer: MEDICARE

## 2023-04-05 PROCEDURE — 97110 THERAPEUTIC EXERCISES: CPT

## 2023-04-05 NOTE — PROGRESS NOTES
[address] [facility]  PHYSICAL THERAPY  [] EVALUATION  [x] DAILY NOTE (LAND) [] DAILY NOTE (AQUATIC ) [] PROGRESS NOTE [] DISCHARGE NOTE    [x] OUTPATIENT REHABILITATION CENTER [facility]   [] [place] [address]    [] [facility]   [] Suad Bingham    Date: 2023  Patient Name:  Trang Marin  : 1950  MRN: [id number]  CSN: [id number]    Referring Practitioner Mino Trevzio MD   Diagnosis LEFT HIP    Treatment Diagnosis Pain in L hip, decreased L hip ROM, decreased L LE strength, impaired balance, abnormal gait    Date of Evaluation 3/27/23    Additional Pertinent History B TKA, B rotator cuff surgery, L KAYLI      Functional Outcome Measure Used HOOS    Functional Outcome Score  (3/27/23)       Insurance: Primary: Payor: Maurene Homans /  /  / ,   Secondary:    Authorization Information: PRE CERTIFICATION REQUIRED: NO  INSURANCE THERAPY BENEFIT:  UNLIMITED VISITS BASED ON MEDICAL NECESSITY   AQUATIC THERAPY COVERED: YES  MODALITIES COVERED:  YES  TELEHEALTH COVERED: YES   Visit # 5, 5/10 for progress note   Visits Allowed: Unlimited based on medical necessity    Recertification Date:    Physician Follow-Up: 2 weeks   Physician Orders:    History of Present Illness: Patient had L KAYLI on 3/22/23. Patient reports that he had arthritis and pain in L hip and that lead to L KAYLI. Patient denies any complications with surgery. Patient reports that his incision is over lateral hip. Patient reports that he typically does not use AD for gait. Patient is currently using a standard walker. Patient does have a straight cane available at home. SUBJECTIVE: Patient presenting to therapy with his walker. Patient denies any current pain. He gets occasional pain at his LLE. He reports compliance with his HEP.      OBJECTIVE:  TREATMENT   Precautions: 50% WB L LE until 23, no hip flexion >90 deg, no hip adduction ROM   Pain: Denies currently    \"X in shaded column

## 2023-04-07 ENCOUNTER — HOSPITAL ENCOUNTER (OUTPATIENT)
Dept: PHYSICAL THERAPY | Age: 73
Setting detail: THERAPIES SERIES
Discharge: HOME | End: 2023-04-07
Payer: MEDICARE

## 2023-04-07 PROCEDURE — 97110 THERAPEUTIC EXERCISES: CPT

## 2023-04-07 NOTE — PROGRESS NOTES
50% WB L LE until 4/5/23, no hip flexion >90 deg, no hip adduction ROM   Pain: Denies currently    \"X in shaded column indicates activity completed today    *\" next to exercise/intervention indicates progression   Modalities Parameters/  Location  Notes                     Manual Therapy Time/Technique  Notes                     Exercise/Intervention   Notes   Nustep: seat 9, arms 10 6 min Level 4 X    Quad set 15 x 5 sec      Heel slides 12 x       SLR 2x15      Glute set 20 x 5 sec      Supine hip abduction 2x15      SAQ 2x10x5 sec      Bridge 2x15             LAQ 2x12x5 sec      HS curl 10 x      NK table: L knee flexion/extension  20*x3 sec ea  2.5# X           Standing:       Heel/toe raises 20x   X    Squat 15  X    HS curls- bilateral  x15  X    3 way hip- bilateral  X15  X Precautions maintained   Alternating marches  15*x  X    Step ups: forward/lateral- B lead forward/ L lead lateral  15x ea 6* inch  X    Dynamic gait: walking marches, side stepping with LLE leading, retro walking*  2 laps ea  X    Feet together EC * 2x30s  X Slight use of // bars          Step negotiation 3x4 steps   Educated on correct non-reciprocating pattern with left handrail- descended retro 2x, forward 1x   Gait training with SPC and without AD Around clinic         Specific Interventions Next Treatment: Nu step, L hip strength-no hip flexion > 90 and no adduction, HS and hip flexor stretching, gait, steps, balance. Activity/Treatment Tolerance:8  [x]  Patient tolerated treatment well  []  Patient limited by fatigue  []  Patient limited by pain   []  Patient limited by medical complications  []  Other:     Assessment: Continued with strengthening as documented above. Progressions indicated by * in the exercise grid above. He did require occasional cues for upright posture and controlled movements while performing standing exercises.     GOALS:  Patient Goal: to return to prior level of function     Short Term Goals: 4

## 2023-04-17 ENCOUNTER — APPOINTMENT (OUTPATIENT)
Dept: PHYSICAL THERAPY | Age: 73
End: 2023-04-17
Payer: MEDICARE

## 2023-04-19 ENCOUNTER — APPOINTMENT (OUTPATIENT)
Dept: PHYSICAL THERAPY | Age: 73
End: 2023-04-19
Payer: MEDICARE

## 2023-04-20 ENCOUNTER — APPOINTMENT (OUTPATIENT)
Dept: PHYSICAL THERAPY | Age: 73
End: 2023-04-20
Payer: MEDICARE

## 2023-06-22 ENCOUNTER — OFFICE VISIT (OUTPATIENT)
Dept: FAMILY MEDICINE CLINIC | Age: 73
End: 2023-06-22

## 2023-06-22 VITALS
WEIGHT: 216.25 LBS | SYSTOLIC BLOOD PRESSURE: 128 MMHG | HEIGHT: 67 IN | BODY MASS INDEX: 33.94 KG/M2 | DIASTOLIC BLOOD PRESSURE: 80 MMHG | RESPIRATION RATE: 16 BRPM | HEART RATE: 72 BPM

## 2023-06-22 DIAGNOSIS — E78.00 PURE HYPERCHOLESTEROLEMIA: ICD-10-CM

## 2023-06-22 DIAGNOSIS — G30.9 ALZHEIMER'S DISEASE, UNSPECIFIED (CODE) (HCC): ICD-10-CM

## 2023-06-22 DIAGNOSIS — I25.10 ATHEROSCLEROSIS OF NATIVE CORONARY ARTERY OF NATIVE HEART WITHOUT ANGINA PECTORIS: ICD-10-CM

## 2023-06-22 DIAGNOSIS — H90.3 SENSORINEURAL HEARING LOSS (SNHL) OF BOTH EARS: ICD-10-CM

## 2023-06-22 DIAGNOSIS — Z96.21 COCHLEAR IMPLANT IN PLACE WITH MULTIPLE CHANNELS: ICD-10-CM

## 2023-06-22 DIAGNOSIS — N40.0 BENIGN PROSTATIC HYPERPLASIA WITHOUT LOWER URINARY TRACT SYMPTOMS: ICD-10-CM

## 2023-06-22 DIAGNOSIS — I25.10 CORONARY ARTERY DISEASE INVOLVING NATIVE CORONARY ARTERY OF NATIVE HEART WITHOUT ANGINA PECTORIS: ICD-10-CM

## 2023-06-22 DIAGNOSIS — I48.0 PAF (PAROXYSMAL ATRIAL FIBRILLATION) (HCC): Primary | ICD-10-CM

## 2023-06-22 SDOH — ECONOMIC STABILITY: INCOME INSECURITY: HOW HARD IS IT FOR YOU TO PAY FOR THE VERY BASICS LIKE FOOD, HOUSING, MEDICAL CARE, AND HEATING?: NOT HARD AT ALL

## 2023-06-22 SDOH — ECONOMIC STABILITY: FOOD INSECURITY: WITHIN THE PAST 12 MONTHS, THE FOOD YOU BOUGHT JUST DIDN'T LAST AND YOU DIDN'T HAVE MONEY TO GET MORE.: NEVER TRUE

## 2023-06-22 SDOH — ECONOMIC STABILITY: HOUSING INSECURITY
IN THE LAST 12 MONTHS, WAS THERE A TIME WHEN YOU DID NOT HAVE A STEADY PLACE TO SLEEP OR SLEPT IN A SHELTER (INCLUDING NOW)?: NO

## 2023-06-22 SDOH — ECONOMIC STABILITY: FOOD INSECURITY: WITHIN THE PAST 12 MONTHS, YOU WORRIED THAT YOUR FOOD WOULD RUN OUT BEFORE YOU GOT MONEY TO BUY MORE.: NEVER TRUE

## 2023-06-22 ASSESSMENT — ENCOUNTER SYMPTOMS
CHEST TIGHTNESS: 0
ABDOMINAL PAIN: 0
SHORTNESS OF BREATH: 0
BACK PAIN: 0
CONSTIPATION: 0
ORTHOPNEA: 0
BLOOD IN STOOL: 0
NAUSEA: 0
TROUBLE SWALLOWING: 0
EYE PAIN: 0
COUGH: 0
SORE THROAT: 0

## 2023-06-22 ASSESSMENT — PATIENT HEALTH QUESTIONNAIRE - PHQ9
SUM OF ALL RESPONSES TO PHQ QUESTIONS 1-9: 0
SUM OF ALL RESPONSES TO PHQ9 QUESTIONS 1 & 2: 0
2. FEELING DOWN, DEPRESSED OR HOPELESS: 0
1. LITTLE INTEREST OR PLEASURE IN DOING THINGS: 0
SUM OF ALL RESPONSES TO PHQ QUESTIONS 1-9: 0

## 2023-06-22 NOTE — PROGRESS NOTES
Subjective:      Patient ID: Richard Garcia is a 67 y.o. male. Ashd  stable      Par  atrial    fib  stable       Sleep apnea  noted        Alsheimers     noted         Hypertension  This is a chronic problem. The current episode started more than 1 year ago. The problem has been resolved since onset. The problem is controlled. Pertinent negatives include no chest pain, headaches, orthopnea, palpitations, peripheral edema or shortness of breath. The current treatment provides significant improvement. There are no compliance problems. Hyperlipidemia  This is a chronic problem. The current episode started more than 1 year ago. The problem is controlled. There are no known factors aggravating his hyperlipidemia. Pertinent negatives include no chest pain or shortness of breath. Current antihyperlipidemic treatment includes statins. Past Medical History:   Diagnosis Date    Cancer [facility])     skin    Colon polyps 2013      sheik    due  2018    Coronary artery disease 2016    stent  to lad  and  70% circ    H/O colonoscopy 2003    negative    Hyperlipidemia     Kidney stone 1994    Loss of hearing     FABY (obstructive sleep apnea)     Osteoarthritis     PAF (paroxysmal atrial fibrillation) (HCC)       Review of Systems   Constitutional:  Negative for fatigue and fever. HENT:  Negative for congestion, ear pain, postnasal drip, sore throat and trouble swallowing. Eyes:  Negative for pain. Respiratory:  Negative for cough, chest tightness and shortness of breath. Cardiovascular:  Negative for chest pain, palpitations, orthopnea and leg swelling. Gastrointestinal:  Negative for abdominal pain, blood in stool, constipation and nausea. Genitourinary:  Negative for difficulty urinating, frequency and urgency. Musculoskeletal:  Negative for arthralgias, back pain, joint swelling and neck stiffness. Skin:  Negative for rash. Neurological:  Negative for dizziness, weakness and headaches.

## 2023-09-29 NOTE — PROGRESS NOTES
[facility]      [address] [facility]         [phone]  10/2/2023   Chief Complaint   Patient presents with    Follow-up     14 month FABY follow up with download after pressure change. Pt of Dr. Jong Silverman    PAP Download:   Original or initial AHI: 11.6     Date of initial study: 1/23/19      Compliant  100 %     Noncompliant 0%     PAP Type CPAP Level 45wmX62  Avg Hrs/Day : 8 hours 2 minutes 19 seconds  AHI: 3.6   Recorded compliance dates: 9/2/23-10/1/23  Machine/Mfg:   [] ResMed    [x] Respironics/Dreamstation   Interface:   [x] Nasal    [] Nasal pillows   [] FFM      Provider:      [] SR-HME       [x] Aaron    [] Mika Se    [] Asaf               [] [facility]      [] Adaptive    [] [address] [facility] [address]:      [] Other    Neck Size: 16.5 inches  Mallampati 3  ESS:  2  SAQLI: 98    Here is a scan of the most recent download:        Presentation:   Dayana Lai presents for sleep medicine follow up for obstructive sleep apnea  Since the last visit, Dayana Lai is doing well with PAP. He is sleeping well and feels rested. Equipment issues: The pressure is  acceptable, the mask is acceptable     Sleep issues:  Do you feel better? Yes  More rested? Yes   Better concentration? yes    Progress History:   Since last visit any new medical issues? No  New ER or hospital visits? No  Any new or changes in medicines? No  Any new sleep medicines? No    Review of Systems -   Review of Systems   Constitutional:  Negative for activity change, appetite change, chills and fever. HENT:  Negative for congestion and postnasal drip. Eyes: Negative. Respiratory:  Negative for cough, chest tightness, shortness of breath, wheezing and stridor. Cardiovascular:  Negative for chest pain and leg swelling. Gastrointestinal:  Negative for diarrhea and nausea. Endocrine: Negative. Genitourinary: Negative. Musculoskeletal: Negative. Negative for arthralgias and back pain.    Skin:

## 2023-10-02 ENCOUNTER — OFFICE VISIT (OUTPATIENT)
Dept: PULMONOLOGY | Age: 73
End: 2023-10-02
Payer: MEDICARE

## 2023-10-02 VITALS
BODY MASS INDEX: 34.94 KG/M2 | WEIGHT: 222.6 LBS | HEIGHT: 67 IN | OXYGEN SATURATION: 93 % | HEART RATE: 62 BPM | SYSTOLIC BLOOD PRESSURE: 122 MMHG | DIASTOLIC BLOOD PRESSURE: 72 MMHG

## 2023-10-02 DIAGNOSIS — E66.9 OBESITY (BMI 30-39.9): ICD-10-CM

## 2023-10-02 DIAGNOSIS — G47.33 OSA ON CPAP: Primary | ICD-10-CM

## 2023-10-02 PROCEDURE — 1123F ACP DISCUSS/DSCN MKR DOCD: CPT | Performed by: PHYSICIAN ASSISTANT

## 2023-10-02 PROCEDURE — 99213 OFFICE O/P EST LOW 20 MIN: CPT | Performed by: PHYSICIAN ASSISTANT

## 2023-10-02 ASSESSMENT — ENCOUNTER SYMPTOMS
DIARRHEA: 0
CHEST TIGHTNESS: 0
WHEEZING: 0
NAUSEA: 0
COUGH: 0
BACK PAIN: 0
ALLERGIC/IMMUNOLOGIC NEGATIVE: 1
EYES NEGATIVE: 1
SHORTNESS OF BREATH: 0
STRIDOR: 0

## 2023-10-03 ENCOUNTER — NURSE ONLY (OUTPATIENT)
Dept: LAB | Age: 73
End: 2023-10-03

## 2023-10-03 DIAGNOSIS — I48.0 PAF (PAROXYSMAL ATRIAL FIBRILLATION) (HCC): ICD-10-CM

## 2023-10-03 DIAGNOSIS — E78.00 PURE HYPERCHOLESTEROLEMIA: ICD-10-CM

## 2023-10-03 DIAGNOSIS — N40.0 BENIGN PROSTATIC HYPERPLASIA WITHOUT LOWER URINARY TRACT SYMPTOMS: ICD-10-CM

## 2023-10-03 DIAGNOSIS — I25.10 ATHEROSCLEROSIS OF NATIVE CORONARY ARTERY OF NATIVE HEART WITHOUT ANGINA PECTORIS: ICD-10-CM

## 2023-10-03 LAB
ALBUMIN SERPL BCG-MCNC: 4.2 G/DL (ref 3.5–5.1)
ALP SERPL-CCNC: 96 U/L (ref 38–126)
ALT SERPL W/O P-5'-P-CCNC: 16 U/L (ref 11–66)
ANION GAP SERPL CALC-SCNC: 11 MEQ/L (ref 8–16)
AST SERPL-CCNC: 20 U/L (ref 5–40)
BASOPHILS ABSOLUTE: 0.1 THOU/MM3 (ref 0–0.1)
BASOPHILS NFR BLD AUTO: 0.8 %
BILIRUB SERPL-MCNC: 1.8 MG/DL (ref 0.3–1.2)
BUN SERPL-MCNC: 15 MG/DL (ref 7–22)
CALCIUM SERPL-MCNC: 9.3 MG/DL (ref 8.5–10.5)
CHLORIDE SERPL-SCNC: 109 MEQ/L (ref 98–111)
CHOLEST SERPL-MCNC: 109 MG/DL (ref 100–199)
CO2 SERPL-SCNC: 24 MEQ/L (ref 23–33)
CREAT SERPL-MCNC: 1 MG/DL (ref 0.4–1.2)
DEPRECATED RDW RBC AUTO: 49.3 FL (ref 35–45)
EOSINOPHIL NFR BLD AUTO: 3.7 %
EOSINOPHILS ABSOLUTE: 0.3 THOU/MM3 (ref 0–0.4)
ERYTHROCYTE [DISTWIDTH] IN BLOOD BY AUTOMATED COUNT: 14.8 % (ref 11.5–14.5)
GFR SERPL CREATININE-BSD FRML MDRD: > 60 ML/MIN/1.73M2
GLUCOSE SERPL-MCNC: 106 MG/DL (ref 70–108)
HCT VFR BLD AUTO: 47.7 % (ref 42–52)
HDLC SERPL-MCNC: 60 MG/DL
HGB BLD-MCNC: 15.8 GM/DL (ref 14–18)
IMM GRANULOCYTES # BLD AUTO: 0.03 THOU/MM3 (ref 0–0.07)
IMM GRANULOCYTES NFR BLD AUTO: 0.4 %
LDLC SERPL CALC-MCNC: 38 MG/DL
LYMPHOCYTES ABSOLUTE: 1.8 THOU/MM3 (ref 1–4.8)
LYMPHOCYTES NFR BLD AUTO: 21.7 %
MCH RBC QN AUTO: 30.1 PG (ref 26–33)
MCHC RBC AUTO-ENTMCNC: 33.1 GM/DL (ref 32.2–35.5)
MCV RBC AUTO: 90.9 FL (ref 80–94)
MONOCYTES ABSOLUTE: 0.7 THOU/MM3 (ref 0.4–1.3)
MONOCYTES NFR BLD AUTO: 7.8 %
NEUTROPHILS NFR BLD AUTO: 65.6 %
NRBC BLD AUTO-RTO: 0 /100 WBC
PLATELET # BLD AUTO: 137 THOU/MM3 (ref 130–400)
PMV BLD AUTO: 11.3 FL (ref 9.4–12.4)
POTASSIUM SERPL-SCNC: 4.2 MEQ/L (ref 3.5–5.2)
PROT SERPL-MCNC: 6.7 G/DL (ref 6.1–8)
PSA SERPL-MCNC: 0.36 NG/ML (ref 0–1)
RBC # BLD AUTO: 5.25 MILL/MM3 (ref 4.7–6.1)
SEGMENTED NEUTROPHILS ABSOLUTE COUNT: 5.5 THOU/MM3 (ref 1.8–7.7)
SODIUM SERPL-SCNC: 144 MEQ/L (ref 135–145)
TRIGL SERPL-MCNC: 53 MG/DL (ref 0–199)
TSH SERPL DL<=0.005 MIU/L-ACNC: 2.05 UIU/ML (ref 0.4–4.2)
WBC # BLD AUTO: 8.4 THOU/MM3 (ref 4.8–10.8)

## 2023-10-04 ENCOUNTER — TELEPHONE (OUTPATIENT)
Dept: FAMILY MEDICINE CLINIC | Age: 73
End: 2023-10-04

## 2023-10-04 NOTE — TELEPHONE ENCOUNTER
----- Message from Nancie Poole MD sent at 10/4/2023 12:42 AM EDT -----  Call all labs  excellent  chol 109      No changes

## 2023-10-24 ENCOUNTER — OFFICE VISIT (OUTPATIENT)
Dept: FAMILY MEDICINE CLINIC | Age: 73
End: 2023-10-24

## 2023-10-24 VITALS
BODY MASS INDEX: 35.2 KG/M2 | DIASTOLIC BLOOD PRESSURE: 72 MMHG | RESPIRATION RATE: 16 BRPM | HEIGHT: 67 IN | WEIGHT: 224.25 LBS | HEART RATE: 64 BPM | SYSTOLIC BLOOD PRESSURE: 130 MMHG

## 2023-10-24 DIAGNOSIS — G30.9 ALZHEIMER'S DISEASE, UNSPECIFIED (CODE) (HCC): ICD-10-CM

## 2023-10-24 DIAGNOSIS — H90.3 ASNHL (ASYMMETRICAL SENSORINEURAL HEARING LOSS): ICD-10-CM

## 2023-10-24 DIAGNOSIS — M15.9 PRIMARY OSTEOARTHRITIS INVOLVING MULTIPLE JOINTS: ICD-10-CM

## 2023-10-24 DIAGNOSIS — I48.0 PAF (PAROXYSMAL ATRIAL FIBRILLATION) (HCC): Primary | ICD-10-CM

## 2023-10-24 DIAGNOSIS — H90.3 SENSORINEURAL HEARING LOSS (SNHL) OF BOTH EARS: ICD-10-CM

## 2023-10-24 DIAGNOSIS — I25.10 CORONARY ARTERY DISEASE INVOLVING NATIVE CORONARY ARTERY OF NATIVE HEART WITHOUT ANGINA PECTORIS: ICD-10-CM

## 2023-10-24 DIAGNOSIS — E78.00 PURE HYPERCHOLESTEROLEMIA: ICD-10-CM

## 2023-10-24 PROCEDURE — 99213 OFFICE O/P EST LOW 20 MIN: CPT | Performed by: FAMILY MEDICINE

## 2023-10-24 PROCEDURE — 1123F ACP DISCUSS/DSCN MKR DOCD: CPT | Performed by: FAMILY MEDICINE

## 2023-10-24 ASSESSMENT — ENCOUNTER SYMPTOMS
SORE THROAT: 0
CONSTIPATION: 0
NAUSEA: 0
TROUBLE SWALLOWING: 0
BLOOD IN STOOL: 0
CHEST TIGHTNESS: 0
BACK PAIN: 0
EYE PAIN: 0
SHORTNESS OF BREATH: 0
COUGH: 0
ABDOMINAL PAIN: 0

## 2023-11-27 RX ORDER — DILTIAZEM HYDROCHLORIDE 120 MG/1
120 CAPSULE, EXTENDED RELEASE ORAL DAILY
Qty: 90 CAPSULE | Refills: 3 | OUTPATIENT
Start: 2023-11-27

## 2023-11-27 NOTE — TELEPHONE ENCOUNTER
Toprol 25 mg daily  Diltiazem 120 mg daily  Atorvastatin 40 mg nightly    Next appt 3/13/24  Labs 10/3/23

## 2023-11-28 RX ORDER — METOPROLOL SUCCINATE 25 MG/1
TABLET, EXTENDED RELEASE ORAL
Qty: 90 TABLET | Refills: 0 | Status: SHIPPED | OUTPATIENT
Start: 2023-11-28

## 2023-11-28 RX ORDER — DILTIAZEM HYDROCHLORIDE 120 MG/1
120 CAPSULE, COATED, EXTENDED RELEASE ORAL DAILY
Qty: 90 CAPSULE | Refills: 0 | Status: SHIPPED | OUTPATIENT
Start: 2023-11-28

## 2023-11-28 RX ORDER — ATORVASTATIN CALCIUM 40 MG/1
40 TABLET, FILM COATED ORAL NIGHTLY
Qty: 90 TABLET | Refills: 0 | Status: SHIPPED | OUTPATIENT
Start: 2023-11-28

## 2024-02-12 ENCOUNTER — HOSPITAL ENCOUNTER (OUTPATIENT)
Dept: PHYSICAL THERAPY | Age: 74
Setting detail: THERAPIES SERIES
Discharge: HOME | End: 2024-02-12
Payer: MEDICARE

## 2024-02-12 PROCEDURE — 97035 APP MDLTY 1+ULTRASOUND EA 15: CPT

## 2024-02-12 PROCEDURE — 97161 PT EVAL LOW COMPLEX 20 MIN: CPT

## 2024-02-12 NOTE — PROGRESS NOTES
** PLEASE SIGN, DATE AND TIME CERTIFICATION BELOW AND RETURN TO [facility] OUTPATIENT REHABILITATION (FAX #: [phone]).  ATTEST/CO-SIGN IF ACCESSING VIA INXunlei.  THANK YOU.**    I certify that I have examined the patient below and determined that Physical Medicine and Rehabilitation service is necessary and that I approve the established plan of care for up to 90 days or as specifically noted.  Attestation, signature or co-signature of physician indicates approval of certification requirements.    ________________________ ____________ __________  Physician Signature   Date   Time  [facility]  PHYSICAL THERAPY  [x] EVALUATION  [] DAILY NOTE (LAND) [] DAILY NOTE (AQUATIC ) [] PROGRESS NOTE [] DISCHARGE NOTE    [x] OUTPATIENT REHABILITATION [facility]   [] [facility]    [] [facility]   [] [facility]    Date: 2024  Patient Name:  Jef Baker  : 1950  MRN: [id number]  CSN: [id number]    Referring Practitioner Lombardi, Adolph V., MD    Diagnosis Unilateral primary osteoarthritis, right hip    Treatment Diagnosis M25.551  Right Hip Pain  M25,651 Stiffness of right hip, not elsewhere classified  R53.1 Weakness   Date of Evaluation 24    Additional Pertinent History Skin Cancer, Coronary Artery Disease (Stent), OA, Loss of hearing, FABY, CTR, Cholecystectomy, Cochlear implant, Bilateral TKR, Bilateral RTC repair, L THR (2023)       Functional Outcome Measure Used Horeggie Roger    Functional Outcome Score  (24)       Insurance: Primary: Payor: [employer] /  /  / ,   Secondary:    Authorization Information: OUTPATIENT BENEFITS:               DEDUCTIBLE: $na                  OUT OF POCKET: $1500 none met              INSURANCE PAYS AT: 96%  once deductible met              PATIENT RESPONSIBILITY AND/OR CO-PAY: 4%  SECONDARY INSURANCE COMPANY:        PRE CERTIFICATION REQUIRED: no  INSURANCE THERAPY BENEFIT:  Calendar year visits

## 2024-02-14 ENCOUNTER — HOSPITAL ENCOUNTER (OUTPATIENT)
Dept: PHYSICAL THERAPY | Age: 74
Setting detail: THERAPIES SERIES
Discharge: HOME | End: 2024-02-14
Payer: MEDICARE

## 2024-02-14 PROCEDURE — 97110 THERAPEUTIC EXERCISES: CPT

## 2024-02-14 PROCEDURE — 97035 APP MDLTY 1+ULTRASOUND EA 15: CPT

## 2024-02-16 ENCOUNTER — HOSPITAL ENCOUNTER (OUTPATIENT)
Dept: PHYSICAL THERAPY | Age: 74
Setting detail: THERAPIES SERIES
Discharge: HOME | End: 2024-02-16
Payer: MEDICARE

## 2024-02-16 PROCEDURE — 97110 THERAPEUTIC EXERCISES: CPT

## 2024-02-16 PROCEDURE — 97035 APP MDLTY 1+ULTRASOUND EA 15: CPT

## 2024-02-16 NOTE — PROGRESS NOTES
well, went back to rehab a couple of months ago secondary to having some IT band symptoms within his Left hip region, helped with left hip, however started to developed some right lateral hip pain. Pt notes that his hip pain \"goes in spurts\", \"it feels weak in there\". Pt underwent right hip injection 1/24/24, helping greatly with his pain, has been wearing off some as of late. Pt's pain exacerbated with getting in/out of the car. Pt's left hip has been doing well. Pt did have radiograph of his right hip completed, noting \"there is still some space there\". Pt notes that he is still completing HEP daily from previous PT treatment.      SUBJECTIVE: Patient denies any current pain. Patient offers no new complaints. He has been working on his HEP.     Objective:    TREATMENT   Precautions:    Pain: 0/10 right lateral hip     \"X” in shaded column indicates activity completed today    “*\" next to exercise/intervention indicates progression   Modalities Parameters/  Location  Notes   Phono (Dexa .4%/Lido .1%) Right trochanteric bursa  8 min Intensity: 1.0 Freq: 1.0  X Pt left side lying, no adverse reactions noted pre or post phono                Manual Therapy Time/Technique  Notes                     Exercise/Intervention   Notes   Nu-Step seat 8/arm 7 Level 2* 5 minutes  X    TA 10*x3 sec  X Cues to breathe and relax upper body   TA + Alt March  8  X    TA + SLR  8x5 sec  X    Bridge 10* x 3 sec  X    Resisted Clamshell. (Hook lying Bilateral/Unilateral) 10*x ea Green X Pt noted discomfort at right lateral hip- cues for smaller ROM with improvement noted in discomfort    S/L Hip Abduction R/L 8x3 sec  X A little tenderness on right hip with right side lying. No lasting soreness  Only performed R today                 Leg Press DL / SL        Hydro Hip- bilateral*  10x  L3 X Add unilateral next session    4-way Hip Machine        Heel/Toe Raises 10  X    Forward Step-up  4 inch R/L 10  X    Lateral Step-up 4 inch R/L  10

## 2024-02-19 ENCOUNTER — HOSPITAL ENCOUNTER (OUTPATIENT)
Dept: PHYSICAL THERAPY | Age: 74
Setting detail: THERAPIES SERIES
Discharge: HOME | End: 2024-02-19
Payer: MEDICARE

## 2024-02-19 PROCEDURE — 97110 THERAPEUTIC EXERCISES: CPT

## 2024-02-19 PROCEDURE — 97035 APP MDLTY 1+ULTRASOUND EA 15: CPT

## 2024-02-19 NOTE — PROGRESS NOTES
[facility]  PHYSICAL THERAPY  [] EVALUATION  [x] DAILY NOTE (LAND) [] DAILY NOTE (AQUATIC ) [] PROGRESS NOTE [] DISCHARGE NOTE    [x] OUTPATIENT REHABILITATION CENTER [facility]   [] [place] [facility]    [] [facility]   [] [facility]    Date: 2024  Patient Name:  Jef Baker  : 1950  MRN: [id number]  CSN: [id number]    Referring Practitioner Lombardi, Adolph V., MD    Diagnosis Unilateral primary osteoarthritis, right hip    Treatment Diagnosis M25.551  Right Hip Pain  M25,651 Stiffness of right hip, not elsewhere classified  R53.1 Weakness   Date of Evaluation 24    Additional Pertinent History Skin Cancer, Coronary Artery Disease (Stent), OA, Loss of hearing, FABY, CTR, Cholecystectomy, Cochlear implant, Bilateral TKR, Bilateral RTC repair, L THR (2023)       Functional Outcome Measure Used Hoos     Functional Outcome Score  (24)       Insurance: Primary: Payor: T MEDICARE /  /  / ,   Secondary:    Authorization Information: OUTPATIENT BENEFITS:               DEDUCTIBLE: $na                  OUT OF POCKET: $1500 none met              INSURANCE PAYS AT: 96%  once deductible met              PATIENT RESPONSIBILITY AND/OR CO-PAY: 4%  SECONDARY INSURANCE COMPANY:        PRE CERTIFICATION REQUIRED: no  INSURANCE THERAPY BENEFIT:  Calendar year visits are based on Medical Center  AQUATIC THERAPY COVERED: yes  MODALITIES COVERED:  yes   Approved Procedure Codes: Authorization of Specific CPT Codes Not Required  (Codes requested indicated by red font, codes approved indicated by black font)   Visit # 4, 4/10 for progress note   Visits Allowed: BMN   Recertification Date: 3/11/24 or 12 visits    Physician Follow-Up: Prn    Physician Orders: Eval and treat US/Phono to right troch bursa and hip strengthening program 3x/week for 4 weeks/12 visits    History of Present Illness: Pt reports having LEFT hip replaced in 2023, was doing

## 2024-02-20 RX ORDER — METOPROLOL SUCCINATE 25 MG/1
TABLET, EXTENDED RELEASE ORAL
Qty: 90 TABLET | Refills: 0 | Status: SHIPPED | OUTPATIENT
Start: 2024-02-20

## 2024-02-20 RX ORDER — ATORVASTATIN CALCIUM 40 MG/1
40 TABLET, FILM COATED ORAL NIGHTLY
Qty: 90 TABLET | Refills: 0 | Status: SHIPPED | OUTPATIENT
Start: 2024-02-20

## 2024-02-20 RX ORDER — DILTIAZEM HYDROCHLORIDE 120 MG/1
120 CAPSULE, EXTENDED RELEASE ORAL DAILY
Qty: 90 CAPSULE | Refills: 0 | Status: SHIPPED | OUTPATIENT
Start: 2024-02-20

## 2024-02-21 ENCOUNTER — APPOINTMENT (OUTPATIENT)
Dept: PHYSICAL THERAPY | Age: 74
End: 2024-02-21
Payer: MEDICARE

## 2024-02-22 ENCOUNTER — HOSPITAL ENCOUNTER (OUTPATIENT)
Dept: PHYSICAL THERAPY | Age: 74
Setting detail: THERAPIES SERIES
Discharge: HOME | End: 2024-02-22
Payer: MEDICARE

## 2024-02-22 PROCEDURE — 97110 THERAPEUTIC EXERCISES: CPT

## 2024-02-22 NOTE — PROGRESS NOTES
[facility]  PHYSICAL THERAPY  [] EVALUATION  [x] DAILY NOTE (LAND) [] DAILY NOTE (AQUATIC ) [] PROGRESS NOTE [] DISCHARGE NOTE    [x] OUTPATIENT REHABILITATION CENTER [facility]   [] [place] [facility]    [] [facility]   [] [facility]    Date: 2024  Patient Name:  Jef Baker  : 1950  MRN: [id number]  CSN: [id number]    Referring Practitioner Lombardi, Adolph V., MD    Diagnosis Unilateral primary osteoarthritis, right hip    Treatment Diagnosis M25.551  Right Hip Pain  M25,651 Stiffness of right hip, not elsewhere classified  R53.1 Weakness   Date of Evaluation 24    Additional Pertinent History Skin Cancer, Coronary Artery Disease (Stent), OA, Loss of hearing, FABY, CTR, Cholecystectomy, Cochlear implant, Bilateral TKR, Bilateral RTC repair, L THR (2023)       Functional Outcome Measure Used Hoos     Functional Outcome Score  (24)       Insurance: Primary: Payor: T MEDICARE /  /  / ,   Secondary:    Authorization Information: OUTPATIENT BENEFITS:               DEDUCTIBLE: $na                  OUT OF POCKET: $1500 none met              INSURANCE PAYS AT: 96%  once deductible met              PATIENT RESPONSIBILITY AND/OR CO-PAY: 4%  SECONDARY INSURANCE COMPANY:        PRE CERTIFICATION REQUIRED: no  INSURANCE THERAPY BENEFIT:  Calendar year visits are based on Medical Center  AQUATIC THERAPY COVERED: yes  MODALITIES COVERED:  yes   Approved Procedure Codes: Authorization of Specific CPT Codes Not Required  (Codes requested indicated by red font, codes approved indicated by black font)   Visit # 5, 5/10 for progress note   Visits Allowed: BMN   Recertification Date: 3/11/24 or 12 visits    Physician Follow-Up: Prn    Physician Orders: Eval and treat US/Phono to right troch bursa and hip strengthening program 3x/week for 4 weeks/12 visits    History of Present Illness: Pt reports having LEFT hip replaced in 2023, was doing

## 2024-02-23 ENCOUNTER — HOSPITAL ENCOUNTER (OUTPATIENT)
Dept: PHYSICAL THERAPY | Age: 74
Setting detail: THERAPIES SERIES
Discharge: HOME | End: 2024-02-23
Payer: MEDICARE

## 2024-02-23 PROCEDURE — 97035 APP MDLTY 1+ULTRASOUND EA 15: CPT

## 2024-02-23 PROCEDURE — 97110 THERAPEUTIC EXERCISES: CPT

## 2024-02-23 NOTE — PROGRESS NOTES
verbalize and/or demonstrate understanding of education provided.  Will continue education.  []  Barriers to learning:     PLAN:  Treatment Recommendations: Strengthening, Range of Motion, Endurance Training, Gait Training, Neuromuscular Re-education, Pain Management, Home Exercise Program, Patient Education, Integrative Dry Needling, and Modalities    []  Plan of care initiated.  Plan to see patient 3 times per week for 4 weeks or 12 visits to address the treatment planned outlined above.  [x]  Continue with current plan of care  []  Modify plan of care as follows:    []  Hold pending physician visit  []  Discharge    Time In 0918   Time Out 1000   Timed Code Minutes: 42   Total Treatment Time: 42     Electronically Signed by: Carter Rios PT

## 2024-02-25 SDOH — HEALTH STABILITY: PHYSICAL HEALTH: ON AVERAGE, HOW MANY DAYS PER WEEK DO YOU ENGAGE IN MODERATE TO STRENUOUS EXERCISE (LIKE A BRISK WALK)?: 4 DAYS

## 2024-02-25 ASSESSMENT — PATIENT HEALTH QUESTIONNAIRE - PHQ9
SUM OF ALL RESPONSES TO PHQ9 QUESTIONS 1 & 2: 0
SUM OF ALL RESPONSES TO PHQ QUESTIONS 1-9: 0
2. FEELING DOWN, DEPRESSED OR HOPELESS: 0
1. LITTLE INTEREST OR PLEASURE IN DOING THINGS: 0
SUM OF ALL RESPONSES TO PHQ QUESTIONS 1-9: 0

## 2024-02-25 ASSESSMENT — LIFESTYLE VARIABLES
HOW OFTEN DO YOU HAVE A DRINK CONTAINING ALCOHOL: 2-3 TIMES A WEEK
HOW OFTEN DO YOU HAVE SIX OR MORE DRINKS ON ONE OCCASION: 1
HOW MANY STANDARD DRINKS CONTAINING ALCOHOL DO YOU HAVE ON A TYPICAL DAY: 1
HOW OFTEN DO YOU HAVE A DRINK CONTAINING ALCOHOL: 4
HOW MANY STANDARD DRINKS CONTAINING ALCOHOL DO YOU HAVE ON A TYPICAL DAY: 1 OR 2

## 2024-02-26 ENCOUNTER — HOSPITAL ENCOUNTER (OUTPATIENT)
Dept: PHYSICAL THERAPY | Age: 74
Setting detail: THERAPIES SERIES
Discharge: HOME | End: 2024-02-26
Payer: MEDICARE

## 2024-02-26 PROCEDURE — 97035 APP MDLTY 1+ULTRASOUND EA 15: CPT

## 2024-02-26 PROCEDURE — 97110 THERAPEUTIC EXERCISES: CPT

## 2024-02-26 NOTE — PROGRESS NOTES
well, went back to rehab a couple of months ago secondary to having some IT band symptoms within his Left hip region, helped with left hip, however started to developed some right lateral hip pain. Pt notes that his hip pain \"goes in spurts\", \"it feels weak in there\". Pt underwent right hip injection 1/24/24, helping greatly with his pain, has been wearing off some as of late. Pt's pain exacerbated with getting in/out of the car. Pt's left hip has been doing well. Pt did have radiograph of his right hip completed, noting \"there is still some space there\". Pt notes that he is still completing HEP daily from previous PT treatment.      SUBJECTIVE: Patient notes that he may have over done it this AM, tried to increased resistance/intensity with his home exercises.  Pt notes 90-95% he feels Ok, get a shooting pain at times with getting in/out of chair. Pt notes feeling \"fine\" after his last session. Pt notes that he was going down the steps today and RLE buckled on hime, didn't fall       Objective:    TREATMENT   Precautions:    Pain: Intermittent right lateral hip pain     \"X” in shaded column indicates activity completed today    “*\" next to exercise/intervention indicates progression   Modalities Parameters/  Location  Notes   Phono (Dexa .4%/Lido .1%) Right trochanteric bursa  8 min Intensity: 1.0 Freq: 1.0  X Pt left side lying, no adverse reactions noted pre or post phono                Manual Therapy Time/Technique  Notes                     Exercise/Intervention   Notes   Nu-Step seat 8 Level 4 5 minutes  X    TA 10x3 sec   Cues to breathe and relax upper body   TA + Alt March  10*      TA + SLR  10*x5 sec   No hold time today, but controlled motion    Bridge 10 x 3 sec      Resisted Clamshell. (Hook lying Bilateral/Unilateral) 10x ea Green  Pt noted discomfort at right lateral hip- cues for smaller ROM with improvement noted in discomfort    S/L Hip Abduction R/L 10*x3 sec                    Leg Press DL / SL

## 2024-02-27 ENCOUNTER — OFFICE VISIT (OUTPATIENT)
Dept: FAMILY MEDICINE CLINIC | Age: 74
End: 2024-02-27

## 2024-02-27 VITALS
SYSTOLIC BLOOD PRESSURE: 122 MMHG | WEIGHT: 220.13 LBS | HEART RATE: 72 BPM | HEIGHT: 67 IN | RESPIRATION RATE: 16 BRPM | BODY MASS INDEX: 34.55 KG/M2 | DIASTOLIC BLOOD PRESSURE: 74 MMHG

## 2024-02-27 DIAGNOSIS — I10 PRIMARY HYPERTENSION: ICD-10-CM

## 2024-02-27 DIAGNOSIS — H90.3 SENSORINEURAL HEARING LOSS (SNHL) OF BOTH EARS: ICD-10-CM

## 2024-02-27 DIAGNOSIS — Z00.00 MEDICARE ANNUAL WELLNESS VISIT, SUBSEQUENT: Primary | ICD-10-CM

## 2024-02-27 DIAGNOSIS — E78.00 PURE HYPERCHOLESTEROLEMIA: ICD-10-CM

## 2024-02-27 DIAGNOSIS — I48.0 PAF (PAROXYSMAL ATRIAL FIBRILLATION) (HCC): ICD-10-CM

## 2024-02-27 DIAGNOSIS — E66.9 CLASS 1 OBESITY WITHOUT SERIOUS COMORBIDITY WITH BODY MASS INDEX (BMI) OF 34.0 TO 34.9 IN ADULT, UNSPECIFIED OBESITY TYPE: ICD-10-CM

## 2024-02-27 DIAGNOSIS — Z96.21 COCHLEAR IMPLANT IN PLACE WITH MULTIPLE CHANNELS: ICD-10-CM

## 2024-02-27 DIAGNOSIS — G30.9 ALZHEIMER'S DISEASE, UNSPECIFIED (CODE) (HCC): ICD-10-CM

## 2024-02-27 DIAGNOSIS — I25.10 CORONARY ARTERY DISEASE INVOLVING NATIVE CORONARY ARTERY OF NATIVE HEART WITHOUT ANGINA PECTORIS: ICD-10-CM

## 2024-02-27 DIAGNOSIS — G47.33 OSA ON CPAP: ICD-10-CM

## 2024-02-27 PROBLEM — I20.9 ANGINA PECTORIS, UNSPECIFIED (HCC): Status: RESOLVED | Noted: 2022-09-28 | Resolved: 2024-02-27

## 2024-02-27 PROCEDURE — 99213 OFFICE O/P EST LOW 20 MIN: CPT | Performed by: FAMILY MEDICINE

## 2024-02-27 PROCEDURE — 1123F ACP DISCUSS/DSCN MKR DOCD: CPT | Performed by: FAMILY MEDICINE

## 2024-02-27 PROCEDURE — G0439 PPPS, SUBSEQ VISIT: HCPCS | Performed by: FAMILY MEDICINE

## 2024-02-27 ASSESSMENT — ENCOUNTER SYMPTOMS
NAUSEA: 0
ABDOMINAL PAIN: 0
BACK PAIN: 0
CONSTIPATION: 0
BLOOD IN STOOL: 0
EYE PAIN: 0
SHORTNESS OF BREATH: 0
TROUBLE SWALLOWING: 0
SORE THROAT: 0
COUGH: 0
CHEST TIGHTNESS: 0

## 2024-02-27 NOTE — PATIENT INSTRUCTIONS
medical history including lifestyle, illnesses that may run in your family, and various assessments and screenings as appropriate.    After reviewing your medical record and screening and assessments performed today your provider may have ordered immunizations, labs, imaging, and/or referrals for you.  A list of these orders (if applicable) as well as your Preventive Care list are included within your After Visit Summary for your review.    Other Preventive Recommendations:    A preventive eye exam performed by an eye specialist is recommended every 1-2 years to screen for glaucoma; cataracts, macular degeneration, and other eye disorders.  A preventive dental visit is recommended every 6 months.  Try to get at least 150 minutes of exercise per week or 10,000 steps per day on a pedometer .  Order or download the FREE \"Exercise & Physical Activity: Your Everyday Guide\" from [employer]. Call 1-689.425.7650 or search [employer] online.  You need 2310-6831 mg of calcium and 9957-7544 IU of vitamin D per day. It is possible to meet your calcium requirement with diet alone, but a vitamin D supplement is usually necessary to meet this goal.  When exposed to the sun, use a sunscreen that protects against both UVA and UVB radiation with an SPF of 30 or greater. Reapply every 2 to 3 hours or after sweating, drying off with a towel, or swimming.  Always wear a seat belt when traveling in a car. Always wear a helmet when riding a bicycle or motorcycle.

## 2024-02-27 NOTE — PROGRESS NOTES
Topics Concern    Not on file   Social History Narrative    Not on file     Social Determinants of Health     Financial Resource Strain: Low Risk  (6/22/2023)    Overall Financial Resource Strain (CARDIA)     Difficulty of Paying Living Expenses: Not hard at all   Food Insecurity: Not on file (6/22/2023)   Transportation Needs: Unknown (6/22/2023)    PRAPARE - Transportation     Lack of Transportation (Medical): Not on file     Lack of Transportation (Non-Medical): No   Physical Activity: Unknown (2/25/2024)    Exercise Vital Sign     Days of Exercise per Week: 4 days     Minutes of Exercise per Session: Not on file   Stress: Not on file   Social Connections: Not on file   Intimate Partner Violence: Not on file   Housing Stability: Unknown (6/22/2023)    Housing Stability Vital Sign     Unable to Pay for Housing in the Last Year: Not on file     Number of Places Lived in the Last Year: Not on file     Unstable Housing in the Last Year: No     Family History   Problem Relation Age of Onset    Heart Disease Father     High Blood Pressure Father           Review of Systems   Constitutional:  Negative for fatigue and fever.   HENT:  Negative for congestion, ear pain, postnasal drip, sore throat and trouble swallowing.    Eyes:  Negative for pain.   Respiratory:  Negative for cough, chest tightness and shortness of breath.    Cardiovascular:  Negative for chest pain, palpitations and leg swelling.   Gastrointestinal:  Negative for abdominal pain, blood in stool, constipation and nausea.   Genitourinary:  Negative for difficulty urinating, frequency and urgency.   Musculoskeletal:  Negative for arthralgias, back pain, joint swelling and neck stiffness.   Skin:  Negative for rash.   Neurological:  Negative for dizziness, weakness and headaches.   Hematological:  Negative for adenopathy. Does not bruise/bleed easily.   Psychiatric/Behavioral:  Negative for behavioral problems, dysphoric mood and sleep disturbance.

## 2024-02-28 ENCOUNTER — HOSPITAL ENCOUNTER (OUTPATIENT)
Dept: PHYSICAL THERAPY | Age: 74
Setting detail: THERAPIES SERIES
Discharge: HOME | End: 2024-02-28
Payer: MEDICARE

## 2024-02-28 PROCEDURE — 97110 THERAPEUTIC EXERCISES: CPT

## 2024-02-28 NOTE — PROGRESS NOTES
[facility]  PHYSICAL THERAPY  [] EVALUATION  [x] DAILY NOTE (LAND) [] DAILY NOTE (AQUATIC ) [] PROGRESS NOTE [] DISCHARGE NOTE    [x] OUTPATIENT REHABILITATION CENTER [facility]   [] [facility]    [] [facility]   [] [facility]    Date: 2024  Patient Name:  Jef Baker  : 1950  MRN: [id number]  CSN: [id number]    Referring Practitioner Lombardi, Adolph V., MD    Diagnosis Unilateral primary osteoarthritis, right hip    Treatment Diagnosis M25.551  Right Hip Pain  M25,651 Stiffness of right hip, not elsewhere classified  R53.1 Weakness   Date of Evaluation 24    Additional Pertinent History Skin Cancer, Coronary Artery Disease (Stent), OA, Loss of hearing, FABY, CTR, Cholecystectomy, Cochlear implant, Bilateral TKR, Bilateral RTC repair, L THR (2023)       Functional Outcome Measure Used Hoos     Functional Outcome Score  (24)       Insurance: Primary: Payor: T MEDICARE /  /  / ,   Secondary:    Authorization Information: OUTPATIENT BENEFITS:               DEDUCTIBLE: $na                  OUT OF POCKET: $1500 none met              INSURANCE PAYS AT: 96%  once deductible met              PATIENT RESPONSIBILITY AND/OR CO-PAY: 4%  SECONDARY INSURANCE COMPANY:        PRE CERTIFICATION REQUIRED: no  INSURANCE THERAPY BENEFIT:  Calendar year visits are based on Medical Center  AQUATIC THERAPY COVERED: yes  MODALITIES COVERED:  yes   Approved Procedure Codes: Authorization of Specific CPT Codes Not Required  (Codes requested indicated by red font, codes approved indicated by black font)   Visit # 8, 8/10 for progress note   Visits Allowed: BMN   Recertification Date: 3/11/24 or 12 visits    Physician Follow-Up: Prn    Physician Orders: Eval and treat US/Phono to right troch bursa and hip strengthening program 3x/week for 4 weeks/12 visits    History of Present Illness: Pt reports having LEFT hip replaced in 2023, was doing

## 2024-03-01 ENCOUNTER — HOSPITAL ENCOUNTER (OUTPATIENT)
Dept: PHYSICAL THERAPY | Age: 74
Setting detail: THERAPIES SERIES
Discharge: HOME | End: 2024-03-01
Payer: MEDICARE

## 2024-03-01 PROCEDURE — 97110 THERAPEUTIC EXERCISES: CPT

## 2024-03-01 PROCEDURE — 97035 APP MDLTY 1+ULTRASOUND EA 15: CPT

## 2024-03-01 NOTE — PROGRESS NOTES
[facility]  PHYSICAL THERAPY  [] EVALUATION  [x] DAILY NOTE (LAND) [] DAILY NOTE (AQUATIC ) [] PROGRESS NOTE [] DISCHARGE NOTE    [x] OUTPATIENT REHABILITATION CENTER [facility]   [] [facility]    [] [facility]   [] [facility]    Date: 3/1/2024  Patient Name:  Jef Baker  : 1950  MRN: [id number]  CSN: [id number]    Referring Practitioner Lombardi, Adolph V., MD    Diagnosis Unilateral primary osteoarthritis, right hip    Treatment Diagnosis M25.551  Right Hip Pain  M25,651 Stiffness of right hip, not elsewhere classified  R53.1 Weakness   Date of Evaluation 24    Additional Pertinent History Skin Cancer, Coronary Artery Disease (Stent), OA, Loss of hearing, FABY, CTR, Cholecystectomy, Cochlear implant, Bilateral TKR, Bilateral RTC repair, L THR (2023)       Functional Outcome Measure Used Hoos     Functional Outcome Score  (24)       Insurance: Primary: Payor: T MEDICARE /  /  / ,   Secondary:    Authorization Information: OUTPATIENT BENEFITS:               DEDUCTIBLE: $na                  OUT OF POCKET: $1500 none met              INSURANCE PAYS AT: 96%  once deductible met              PATIENT RESPONSIBILITY AND/OR CO-PAY: 4%  SECONDARY INSURANCE COMPANY:        PRE CERTIFICATION REQUIRED: no  INSURANCE THERAPY BENEFIT:  Calendar year visits are based on Medical Center  AQUATIC THERAPY COVERED: yes  MODALITIES COVERED:  yes   Approved Procedure Codes: Authorization of Specific CPT Codes Not Required  (Codes requested indicated by red font, codes approved indicated by black font)   Visit # 9, 10 for progress note   Visits Allowed: BMN   Recertification Date: 3/11/24 or 12 visits    Physician Follow-Up: Prn    Physician Orders: Eval and treat US/Phono to right troch bursa and hip strengthening program 3x/week for 4 weeks/12 visits    History of Present Illness: Pt reports having LEFT hip replaced in 2023, was doing

## 2024-03-04 ENCOUNTER — HOSPITAL ENCOUNTER (OUTPATIENT)
Dept: PHYSICAL THERAPY | Age: 74
Setting detail: THERAPIES SERIES
Discharge: HOME | End: 2024-03-04
Payer: MEDICARE

## 2024-03-04 PROCEDURE — 97035 APP MDLTY 1+ULTRASOUND EA 15: CPT

## 2024-03-04 PROCEDURE — 97110 THERAPEUTIC EXERCISES: CPT

## 2024-03-04 NOTE — PROGRESS NOTES
educated on updated HEP, reiterated once again to Pt to avoid over stressing hip musculature with his exercise regiment, Pt and spouse verbalizing good understanding.          GOALS:  Patient Goal: Improve hip pain     Short Term Goals: Deferred to long term     Long Term Goals: 4 weeks   Pt to decrease pain level from 5/10 to <= 2/10 for improved comfort and activity tolerance. GOAL MET:   .  Discontinue Goal  Pt to improve HOOS score from 8/24 to <=3/24 to indicate a decrease in functional limitations. GOAL NOT MET: 4/24.  Discontinue Goal  Pt to demo Right Hip ER/IR AROM >= 32 degrees for ease with dressing. GOAL NOT MET: IR not met.  Continue Goal  Pt to demo Bilateral hip strength of 5-/5 to aid in ease with transfers. GOAL NOT MET:  .  Continue Goal  Pt to be independent and compliant with HEP. GOAL MET:   .  Discontinue Goal      Patient Education:   [x]  HEP/Education Completed: goal/objective assessment, updated HEP, activity modifications, tissue healing    Certona Access Code: [email or web address]    []  No new Education completed  []  Reviewed Prior HEP      [x]  Patient verbalized and/or demonstrated understanding of education provided.  []  Patient unable to verbalize and/or demonstrate understanding of education provided.  Will continue education.  []  Barriers to learning:     PLAN:  Treatment Recommendations: Strengthening, Range of Motion, Endurance Training, Gait Training, Neuromuscular Re-education, Pain Management, Home Exercise Program, Patient Education, Integrative Dry Needling, and Modalities    []  Plan of care initiated.  Plan to see patient 3 times per week for 4 weeks or 12 visits to address the treatment planned outlined above.  []  Continue with current plan of care  []  Modify plan of care as follows:    [x]  Hold to HEP x2 weeks   []  Discharge    Time In 1431   Time Out 1516   Timed Code Minutes: 45   Total Treatment Time: 45     Electronically Signed by: Carter Rios, PT

## 2024-03-05 DIAGNOSIS — E78.5 HYPERLIPIDEMIA LDL GOAL <70: ICD-10-CM

## 2024-03-05 DIAGNOSIS — I10 PRIMARY HYPERTENSION: ICD-10-CM

## 2024-03-05 DIAGNOSIS — I25.10 CORONARY ARTERY DISEASE INVOLVING NATIVE CORONARY ARTERY OF NATIVE HEART WITHOUT ANGINA PECTORIS: ICD-10-CM

## 2024-03-05 LAB
ALBUMIN SERPL BCG-MCNC: 4.3 G/DL (ref 3.5–5.1)
ALP SERPL-CCNC: 103 U/L (ref 38–126)
ALT SERPL W/O P-5'-P-CCNC: 27 U/L (ref 11–66)
ANION GAP SERPL CALC-SCNC: 10 MEQ/L (ref 8–16)
AST SERPL-CCNC: 30 U/L (ref 5–40)
BILIRUB CONJ SERPL-MCNC: 0.4 MG/DL (ref 0–0.3)
BILIRUB SERPL-MCNC: 2.5 MG/DL (ref 0.3–1.2)
BUN SERPL-MCNC: 16 MG/DL (ref 7–22)
CALCIUM SERPL-MCNC: 9.4 MG/DL (ref 8.5–10.5)
CHLORIDE SERPL-SCNC: 107 MEQ/L (ref 98–111)
CHOLEST SERPL-MCNC: 126 MG/DL (ref 100–199)
CO2 SERPL-SCNC: 23 MEQ/L (ref 23–33)
CREAT SERPL-MCNC: 1 MG/DL (ref 0.4–1.2)
DEPRECATED RDW RBC AUTO: 45.1 FL (ref 35–45)
ERYTHROCYTE [DISTWIDTH] IN BLOOD BY AUTOMATED COUNT: 13.4 % (ref 11.5–14.5)
GFR SERPL CREATININE-BSD FRML MDRD: > 60 ML/MIN/1.73M2
GLUCOSE SERPL-MCNC: 103 MG/DL (ref 70–108)
HCT VFR BLD AUTO: 47.9 % (ref 42–52)
HDLC SERPL-MCNC: 60 MG/DL
HGB BLD-MCNC: 16.4 GM/DL (ref 14–18)
LDLC SERPL CALC-MCNC: 53 MG/DL
MCH RBC QN AUTO: 31.2 PG (ref 26–33)
MCHC RBC AUTO-ENTMCNC: 34.2 GM/DL (ref 32.2–35.5)
MCV RBC AUTO: 91.1 FL (ref 80–94)
PLATELET # BLD AUTO: 153 THOU/MM3 (ref 130–400)
PMV BLD AUTO: 10.8 FL (ref 9.4–12.4)
POTASSIUM SERPL-SCNC: 3.8 MEQ/L (ref 3.5–5.2)
PROT SERPL-MCNC: 7.1 G/DL (ref 6.1–8)
RBC # BLD AUTO: 5.26 MILL/MM3 (ref 4.7–6.1)
SODIUM SERPL-SCNC: 140 MEQ/L (ref 135–145)
TRIGL SERPL-MCNC: 63 MG/DL (ref 0–199)
WBC # BLD AUTO: 6.3 THOU/MM3 (ref 4.8–10.8)

## 2024-03-06 ENCOUNTER — APPOINTMENT (OUTPATIENT)
Dept: PHYSICAL THERAPY | Age: 74
End: 2024-03-06
Payer: MEDICARE

## 2024-03-08 ENCOUNTER — APPOINTMENT (OUTPATIENT)
Dept: PHYSICAL THERAPY | Age: 74
End: 2024-03-08
Payer: MEDICARE

## 2024-03-13 ENCOUNTER — OFFICE VISIT (OUTPATIENT)
Dept: CARDIOLOGY CLINIC | Age: 74
End: 2024-03-13
Payer: MEDICARE

## 2024-03-13 VITALS
HEIGHT: 67 IN | WEIGHT: 220 LBS | SYSTOLIC BLOOD PRESSURE: 111 MMHG | HEART RATE: 70 BPM | DIASTOLIC BLOOD PRESSURE: 63 MMHG | BODY MASS INDEX: 34.53 KG/M2

## 2024-03-13 DIAGNOSIS — E78.00 PURE HYPERCHOLESTEROLEMIA: ICD-10-CM

## 2024-03-13 DIAGNOSIS — I48.0 PAF (PAROXYSMAL ATRIAL FIBRILLATION) (HCC): ICD-10-CM

## 2024-03-13 DIAGNOSIS — I10 PRIMARY HYPERTENSION: ICD-10-CM

## 2024-03-13 DIAGNOSIS — I25.10 CORONARY ARTERY DISEASE INVOLVING NATIVE CORONARY ARTERY OF NATIVE HEART WITHOUT ANGINA PECTORIS: Primary | ICD-10-CM

## 2024-03-13 PROCEDURE — 1123F ACP DISCUSS/DSCN MKR DOCD: CPT | Performed by: INTERNAL MEDICINE

## 2024-03-13 PROCEDURE — 3078F DIAST BP <80 MM HG: CPT | Performed by: INTERNAL MEDICINE

## 2024-03-13 PROCEDURE — 3074F SYST BP LT 130 MM HG: CPT | Performed by: INTERNAL MEDICINE

## 2024-03-13 PROCEDURE — 93000 ELECTROCARDIOGRAM COMPLETE: CPT | Performed by: INTERNAL MEDICINE

## 2024-03-13 PROCEDURE — 99204 OFFICE O/P NEW MOD 45 MIN: CPT | Performed by: INTERNAL MEDICINE

## 2024-03-13 RX ORDER — DILTIAZEM HYDROCHLORIDE 120 MG/1
120 CAPSULE, COATED, EXTENDED RELEASE ORAL DAILY
Qty: 90 CAPSULE | Refills: 3 | Status: SHIPPED | OUTPATIENT
Start: 2024-03-13

## 2024-03-13 RX ORDER — METOPROLOL SUCCINATE 25 MG/1
25 TABLET, EXTENDED RELEASE ORAL DAILY
Qty: 90 TABLET | Refills: 3 | Status: SHIPPED | OUTPATIENT
Start: 2024-03-13

## 2024-03-13 RX ORDER — ATORVASTATIN CALCIUM 40 MG/1
40 TABLET, FILM COATED ORAL NIGHTLY
Qty: 90 TABLET | Refills: 3 | Status: SHIPPED | OUTPATIENT
Start: 2024-03-13

## 2024-03-13 RX ORDER — NITROGLYCERIN 0.4 MG/1
0.4 TABLET SUBLINGUAL EVERY 5 MIN PRN
Qty: 25 TABLET | Refills: 3 | Status: SHIPPED | OUTPATIENT
Start: 2024-03-13

## 2024-03-13 NOTE — PROGRESS NOTES
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    EKG done today    Denied chest pain, sob, palpitation, dizziness or edema    Feel good    Never smoked    FHX  Father had mI in his 70's  Mother had atr fib      PMHX  CAD s/p LAD stent  HTN  PAF  HLP      Past Surgical History:   Procedure Laterality Date    CARPAL TUNNEL RELEASE      left     CHOLECYSTECTOMY, LAPAROSCOPIC  03/2011    COCHLEAR IMPLANT Right 08/2017    at [facility]  dr sylvester    COLONOSCOPY  01/2019    [facility]   colon polyps  due  2024    CORONARY ANGIOPLASTY WITH STENT PLACEMENT  2016  may    stent  Lda  steph    JOINT REPLACEMENT      bilateral knees    KNEE ARTHROSCOPY  1998; 4/04    left:  Right 9/04    ROTATOR CUFF REPAIR  2004    right/ left     SKIN BIOPSY      SKIN CANCER EXCISION      left AC    TOTAL HIP ARTHROPLASTY Left 03/2023    harley    TOTAL KNEE ARTHROPLASTY      left 10/06; right 6/08       No Known Allergies     Family History   Problem Relation Age of Onset    Heart Disease Father     High Blood Pressure Father         Social History     Socioeconomic History    Marital status:      Spouse name: Berenice    Number of children: 3    Years of education: Not on file    Highest education level: Not on file   Occupational History    Not on file   Tobacco Use    Smoking status: Never    Smokeless tobacco: Never   Substance and Sexual Activity    Alcohol use: Yes     Comment: 1-2 beers a day    Drug use: No    Sexual activity: Yes     Partners: Female   Other Topics Concern    Not on file   Social History Narrative    Not on file     Social Determinants of Health     Financial Resource Strain: Low Risk  (6/22/2023)    Overall Financial Resource Strain (CARDIA)     Difficulty of Paying Living Expenses: Not hard at all   Food Insecurity: Not on file (6/22/2023)   Transportation Needs: Unknown (6/22/2023)    PRAPARE -

## 2024-03-26 ENCOUNTER — HOSPITAL ENCOUNTER (OUTPATIENT)
Age: 74
Discharge: HOME | End: 2024-03-28
Attending: INTERNAL MEDICINE
Payer: MEDICARE

## 2024-03-26 VITALS — SYSTOLIC BLOOD PRESSURE: 111 MMHG | DIASTOLIC BLOOD PRESSURE: 63 MMHG

## 2024-03-26 DIAGNOSIS — E78.00 PURE HYPERCHOLESTEROLEMIA: ICD-10-CM

## 2024-03-26 DIAGNOSIS — I10 PRIMARY HYPERTENSION: ICD-10-CM

## 2024-03-26 DIAGNOSIS — I25.10 CORONARY ARTERY DISEASE INVOLVING NATIVE CORONARY ARTERY OF NATIVE HEART WITHOUT ANGINA PECTORIS: ICD-10-CM

## 2024-03-26 DIAGNOSIS — I48.0 PAF (PAROXYSMAL ATRIAL FIBRILLATION) (HCC): ICD-10-CM

## 2024-03-26 LAB
ECHO AV CUSP MM: 2.1 CM
ECHO AV PEAK GRADIENT: 5 MMHG
ECHO AV PEAK VELOCITY: 1.1 M/S
ECHO AV VELOCITY RATIO: 0.82
ECHO LA AREA 2C: 17 CM2
ECHO LA AREA 4C: 18.6 CM2
ECHO LA DIAMETER: 4.7 CM
ECHO LA MAJOR AXIS: 5.8 CM
ECHO LA MINOR AXIS: 5.2 CM
ECHO LA VOL BP: 48 ML (ref 18–58)
ECHO LA VOL MOD A2C: 45 ML (ref 18–58)
ECHO LA VOL MOD A4C: 47 ML (ref 18–58)
ECHO LV E' LATERAL VELOCITY: 9 CM/S
ECHO LV E' SEPTAL VELOCITY: 7 CM/S
ECHO LV FRACTIONAL SHORTENING: 23 % (ref 28–44)
ECHO LV INTERNAL DIMENSION DIASTOLIC: 4.4 CM (ref 4.2–5.9)
ECHO LV INTERNAL DIMENSION SYSTOLIC: 3.4 CM
ECHO LV IVSD: 0.9 CM (ref 0.6–1)
ECHO LV MASS 2D: 137.8 G (ref 88–224)
ECHO LV POSTERIOR WALL DIASTOLIC: 1 CM (ref 0.6–1)
ECHO LV RELATIVE WALL THICKNESS RATIO: 0.45
ECHO LVOT PEAK GRADIENT: 3 MMHG
ECHO LVOT PEAK VELOCITY: 0.9 M/S
ECHO MV A VELOCITY: 0.38 M/S
ECHO MV E DECELERATION TIME (DT): 222 MS
ECHO MV E VELOCITY: 0.77 M/S
ECHO MV E/A RATIO: 2.03
ECHO MV E/E' LATERAL: 8.56
ECHO MV E/E' RATIO (AVERAGED): 9.78
ECHO MV REGURGITANT PEAK GRADIENT: 85 MMHG
ECHO MV REGURGITANT PEAK VELOCITY: 4.6 M/S
ECHO PV MAX VELOCITY: 0.8 M/S
ECHO PV PEAK GRADIENT: 2 MMHG
ECHO RV INTERNAL DIMENSION: 2.1 CM
ECHO TV E WAVE: 0.5 M/S
ECHO TV REGURGITANT MAX VELOCITY: 2.71 M/S
ECHO TV REGURGITANT MAX VELOCITY: 2.71 M/S
ECHO TV REGURGITANT PEAK GRADIENT: 29 MMHG

## 2024-03-26 PROCEDURE — 93306 TTE W/DOPPLER COMPLETE: CPT

## 2024-03-26 PROCEDURE — 93306 TTE W/DOPPLER COMPLETE: CPT | Performed by: INTERNAL MEDICINE

## 2024-04-13 SDOH — HEALTH STABILITY: PHYSICAL HEALTH: ON AVERAGE, HOW MANY MINUTES DO YOU ENGAGE IN EXERCISE AT THIS LEVEL?: 30 MIN

## 2024-04-13 SDOH — HEALTH STABILITY: PHYSICAL HEALTH: ON AVERAGE, HOW MANY DAYS PER WEEK DO YOU ENGAGE IN MODERATE TO STRENUOUS EXERCISE (LIKE A BRISK WALK)?: 3 DAYS

## 2024-04-15 ENCOUNTER — OFFICE VISIT (OUTPATIENT)
Dept: PRIMARY CARE CLINIC | Age: 74
End: 2024-04-15

## 2024-04-15 VITALS
DIASTOLIC BLOOD PRESSURE: 72 MMHG | SYSTOLIC BLOOD PRESSURE: 136 MMHG | RESPIRATION RATE: 16 BRPM | OXYGEN SATURATION: 97 % | BODY MASS INDEX: 34.18 KG/M2 | TEMPERATURE: 97.7 F | HEIGHT: 67 IN | HEART RATE: 102 BPM | WEIGHT: 217.8 LBS

## 2024-04-15 DIAGNOSIS — G30.9 ALZHEIMER'S DISEASE, UNSPECIFIED (CODE) (HCC): ICD-10-CM

## 2024-04-15 DIAGNOSIS — C44.90 SKIN CANCER: ICD-10-CM

## 2024-04-15 DIAGNOSIS — N20.0 NEPHROLITHIASIS: ICD-10-CM

## 2024-04-15 DIAGNOSIS — G47.30 SLEEP APNEA, UNSPECIFIED TYPE: ICD-10-CM

## 2024-04-15 DIAGNOSIS — I25.10 CORONARY ARTERY DISEASE INVOLVING NATIVE CORONARY ARTERY OF NATIVE HEART WITHOUT ANGINA PECTORIS: Primary | ICD-10-CM

## 2024-04-15 DIAGNOSIS — I10 PRIMARY HYPERTENSION: ICD-10-CM

## 2024-04-15 PROBLEM — I25.119 ATHEROSCLEROTIC HEART DISEASE OF NATIVE CORONARY ARTERY WITH UNSPECIFIED ANGINA PECTORIS (HCC): Status: RESOLVED | Noted: 2022-09-28 | Resolved: 2024-04-15

## 2024-04-15 ASSESSMENT — PATIENT HEALTH QUESTIONNAIRE - PHQ9
1. LITTLE INTEREST OR PLEASURE IN DOING THINGS: NOT AT ALL
SUM OF ALL RESPONSES TO PHQ QUESTIONS 1-9: 0
SUM OF ALL RESPONSES TO PHQ9 QUESTIONS 1 & 2: 0
SUM OF ALL RESPONSES TO PHQ QUESTIONS 1-9: 0
SUM OF ALL RESPONSES TO PHQ QUESTIONS 1-9: 0
2. FEELING DOWN, DEPRESSED OR HOPELESS: NOT AT ALL
SUM OF ALL RESPONSES TO PHQ QUESTIONS 1-9: 0

## 2024-04-15 NOTE — PROGRESS NOTES
Chief Complaint   Patient presents with    New Patient     Establish care         Jef Baker is a 73 y.o. male who presents to be established    Patient has a past medical history significant for HTN and CAD s/p stent 2014 and is followed by Cardiology and is on diltiazem, metoprolol, lipitor and Baby ASA.  Pt had recent normal bloodwork [lipid panel, CBC, BMP, hepatic function panel] 03/05/2024  Pt did have a recent 2D Echo (see report below)    Pt has hx of skin cancer about 15 years ago    Pt has a hx of nephrolithiasis most recent episode about 9 years ago    Pt has hx of sleep apnea and is on CPAP machine    Pt reports a normal colonoscopy about 5 years ago    Pt has a hx of Alzheimer's dementia and has been on aricept medication over the past 2 years and is followed by Neurology    Pt denies any hx of diabetes or asthma    Surgical hx includes L hip and B/L knee replacements and B/L cochlear implants and cholecystectomy    Pt is a nonsmoker and drinks about 3 beers per week and denies any illegal drug use    FHx positive for prostate cancer (several uncles) and colon cancer (PGF); FHx negative for diabetes      2D Echo 03/26/24    Left Ventricle: Normal left ventricular systolic function with a visually estimated EF of 60 - 65%. Left ventricle size is normal. Normal wall thickness. Normal wall motion. Normal diastolic function.    Left Atrium: Left atrium is mild to moderately dilated.      Review of Systems   Constitutional:  Negative for fatigue and fever.   HENT:  Negative for congestion, nosebleeds and sore throat.    Eyes:         Negative blurred vision or diplopia   Respiratory:  Negative for cough and shortness of breath.    Cardiovascular:  Negative for chest pain, palpitations and leg swelling.   Gastrointestinal:  Negative for abdominal pain, blood in stool, diarrhea, nausea and vomiting.        Negative melena or indigestion   Endocrine: Negative for polydipsia and polyuria.

## 2024-05-01 PROBLEM — G47.33 OSA ON CPAP: Status: ACTIVE | Noted: 2024-05-01

## 2024-05-07 ENCOUNTER — OFFICE VISIT (OUTPATIENT)
Age: 74
End: 2024-05-07
Payer: MEDICARE

## 2024-05-07 VITALS
DIASTOLIC BLOOD PRESSURE: 60 MMHG | SYSTOLIC BLOOD PRESSURE: 124 MMHG | BODY MASS INDEX: 34.53 KG/M2 | WEIGHT: 220 LBS | OXYGEN SATURATION: 95 % | HEART RATE: 66 BPM | HEIGHT: 67 IN

## 2024-05-07 DIAGNOSIS — I25.10 CORONARY ARTERY DISEASE INVOLVING NATIVE CORONARY ARTERY OF NATIVE HEART WITHOUT ANGINA PECTORIS: Primary | ICD-10-CM

## 2024-05-07 DIAGNOSIS — E78.2 MIXED HYPERLIPIDEMIA: ICD-10-CM

## 2024-05-07 DIAGNOSIS — G47.33 OSA ON CPAP: ICD-10-CM

## 2024-05-07 DIAGNOSIS — G30.9 ALZHEIMER'S DISEASE, UNSPECIFIED (CODE) (HCC): ICD-10-CM

## 2024-05-07 DIAGNOSIS — I10 PRIMARY HYPERTENSION: ICD-10-CM

## 2024-05-07 DIAGNOSIS — E66.9 CLASS 1 OBESITY WITHOUT SERIOUS COMORBIDITY WITH BODY MASS INDEX (BMI) OF 34.0 TO 34.9 IN ADULT, UNSPECIFIED OBESITY TYPE: ICD-10-CM

## 2024-05-07 DIAGNOSIS — I47.19 ATRIAL TACHYCARDIA (HCC): ICD-10-CM

## 2024-05-07 PROCEDURE — 93000 ELECTROCARDIOGRAM COMPLETE: CPT | Performed by: INTERNAL MEDICINE

## 2024-05-07 PROCEDURE — 3074F SYST BP LT 130 MM HG: CPT | Performed by: INTERNAL MEDICINE

## 2024-05-07 PROCEDURE — 3078F DIAST BP <80 MM HG: CPT | Performed by: INTERNAL MEDICINE

## 2024-05-07 PROCEDURE — 1123F ACP DISCUSS/DSCN MKR DOCD: CPT | Performed by: INTERNAL MEDICINE

## 2024-05-07 PROCEDURE — 99204 OFFICE O/P NEW MOD 45 MIN: CPT | Performed by: INTERNAL MEDICINE

## 2024-05-07 NOTE — PATIENT INSTRUCTIONS
Follow up with Dr Landry in 1 year     Referral to Neurology at      Call for any questions or concerns.

## 2024-05-20 ENCOUNTER — TELEPHONE (OUTPATIENT)
Dept: PULMONOLOGY | Age: 74
End: 2024-05-20

## 2024-05-20 DIAGNOSIS — G47.33 OSA ON CPAP: Primary | ICD-10-CM

## 2024-05-20 NOTE — TELEPHONE ENCOUNTER
Patients wife (on HIPAA) calling in for patient who was last seen 10/2/2023 and his next appt is 10/1/2024.  His current cpap machine is starting to fall apart but it is still functioning, they had to tape a piece back on.   [employer] did tell them he is eligible for a new machine but [facility] [place] [place] needs the order for a new machine faxed to them.  Please advise.

## 2024-05-22 RX ORDER — DILTIAZEM HYDROCHLORIDE 120 MG/1
120 CAPSULE, EXTENDED RELEASE ORAL DAILY
Qty: 90 CAPSULE | Refills: 3 | Status: SHIPPED | OUTPATIENT
Start: 2024-05-22

## 2024-05-28 RX ORDER — METOPROLOL SUCCINATE 25 MG/1
25 TABLET, EXTENDED RELEASE ORAL DAILY
Qty: 90 TABLET | Refills: 3 | Status: SHIPPED | OUTPATIENT
Start: 2024-05-28

## 2024-05-28 RX ORDER — ATORVASTATIN CALCIUM 40 MG/1
40 TABLET, FILM COATED ORAL NIGHTLY
Qty: 90 TABLET | Refills: 3 | Status: SHIPPED | OUTPATIENT
Start: 2024-05-28

## 2024-07-30 ENCOUNTER — OFFICE VISIT (OUTPATIENT)
Dept: PRIMARY CARE CLINIC | Age: 74
End: 2024-07-30

## 2024-07-30 VITALS
SYSTOLIC BLOOD PRESSURE: 128 MMHG | RESPIRATION RATE: 16 BRPM | HEART RATE: 68 BPM | WEIGHT: 220.8 LBS | BODY MASS INDEX: 34.65 KG/M2 | OXYGEN SATURATION: 98 % | TEMPERATURE: 97.7 F | DIASTOLIC BLOOD PRESSURE: 72 MMHG | HEIGHT: 67 IN

## 2024-07-30 DIAGNOSIS — I10 PRIMARY HYPERTENSION: ICD-10-CM

## 2024-07-30 DIAGNOSIS — G30.9 ALZHEIMER'S DISEASE, UNSPECIFIED (CODE) (HCC): ICD-10-CM

## 2024-07-30 DIAGNOSIS — N20.0 NEPHROLITHIASIS: ICD-10-CM

## 2024-07-30 DIAGNOSIS — I25.10 CORONARY ARTERY DISEASE INVOLVING NATIVE CORONARY ARTERY OF NATIVE HEART WITHOUT ANGINA PECTORIS: Primary | ICD-10-CM

## 2024-07-30 DIAGNOSIS — G47.30 SLEEP APNEA, UNSPECIFIED TYPE: ICD-10-CM

## 2024-07-30 SDOH — ECONOMIC STABILITY: FOOD INSECURITY: WITHIN THE PAST 12 MONTHS, YOU WORRIED THAT YOUR FOOD WOULD RUN OUT BEFORE YOU GOT MONEY TO BUY MORE.: NEVER TRUE

## 2024-07-30 SDOH — ECONOMIC STABILITY: INCOME INSECURITY: HOW HARD IS IT FOR YOU TO PAY FOR THE VERY BASICS LIKE FOOD, HOUSING, MEDICAL CARE, AND HEATING?: NOT HARD AT ALL

## 2024-07-30 SDOH — ECONOMIC STABILITY: FOOD INSECURITY: WITHIN THE PAST 12 MONTHS, THE FOOD YOU BOUGHT JUST DIDN'T LAST AND YOU DIDN'T HAVE MONEY TO GET MORE.: NEVER TRUE

## 2024-07-30 NOTE — PROGRESS NOTES
unspecified  Pt is followed by Neurology and is clinically stable on aricept medication     5. Sleep apnea, unspecified type  Pt clinically stable on CPAP machine         KATARZYNA CORRIGAN MD      Return in about 3 months (around 10/30/2024).       Patient Instructions     Go to the ER ASAP if you develop any chest pain, shortness of breath, facial droop, slurred speech, weakness/numbness in your arms or legs or double vision!

## 2024-08-06 ENCOUNTER — HOSPITAL ENCOUNTER (OUTPATIENT)
Age: 74
Discharge: HOME | End: 2024-08-06
Payer: MEDICARE

## 2024-08-06 DIAGNOSIS — G31.84 MCI (MILD COGNITIVE IMPAIRMENT): ICD-10-CM

## 2024-08-06 DIAGNOSIS — R41.3 MEMORY LOSS: ICD-10-CM

## 2024-08-06 PROCEDURE — 70450 CT HEAD/BRAIN W/O DYE: CPT

## 2024-11-05 ENCOUNTER — OFFICE VISIT (OUTPATIENT)
Dept: PRIMARY CARE CLINIC | Age: 74
End: 2024-11-05

## 2024-11-05 VITALS
DIASTOLIC BLOOD PRESSURE: 80 MMHG | RESPIRATION RATE: 18 BRPM | WEIGHT: 222 LBS | HEART RATE: 65 BPM | HEIGHT: 67 IN | OXYGEN SATURATION: 95 % | BODY MASS INDEX: 34.84 KG/M2 | SYSTOLIC BLOOD PRESSURE: 126 MMHG | TEMPERATURE: 97.1 F

## 2024-11-05 DIAGNOSIS — G47.30 SLEEP APNEA, UNSPECIFIED TYPE: ICD-10-CM

## 2024-11-05 DIAGNOSIS — I25.10 CORONARY ARTERY DISEASE INVOLVING NATIVE CORONARY ARTERY OF NATIVE HEART WITHOUT ANGINA PECTORIS: ICD-10-CM

## 2024-11-05 DIAGNOSIS — G30.9 ALZHEIMER'S DISEASE, UNSPECIFIED (CODE) (HCC): ICD-10-CM

## 2024-11-05 DIAGNOSIS — I10 PRIMARY HYPERTENSION: Primary | ICD-10-CM

## 2024-11-05 DIAGNOSIS — C44.90 SKIN CANCER: ICD-10-CM

## 2024-11-05 NOTE — PROGRESS NOTES
Chief Complaint   Patient presents with    3 Month Follow-Up    Medication Refill         Jef Baker is a 74 y.o. male who presents for routine visit. Pt did not see Dermatology yet as directed     Patient has a past medical history significant for HTN and CAD s/p stent 2014 and is followed by Cardiology and is on diltiazem, metoprolol, lipitor and Baby ASA.  Pt had recent normal bloodwork [lipid panel, CBC, BMP, hepatic function panel] 03/05/2024  Pt did have a recent unremarkable 2D Echo --- Pt did see Cardiology and was felt to be stable with no medication changes     Pt has hx of skin cancer about 15 years ago     Pt has a hx of nephrolithiasis most recent episode about 9 years ago     Pt has hx of sleep apnea and is on CPAP machine     Pt reports a normal colonoscopy about 5 years ago     Pt has a hx of Alzheimer's dementia and has been on aricept medication over the past 2 years and is followed by Neurology and had recent office visit and was felt to be stable and had CT scan head done which showed age-related atrophy with periventricular white matter changes bilaterally consistent with chronic small vessel ischemia.      Pt denies any hx of diabetes or asthma     Surgical hx includes L hip and B/L knee replacements and B/L cochlear implants and cholecystectomy     Pt is a nonsmoker and drinks about 3 beers per week and denies any illegal drug use     FHx positive for prostate cancer (several uncles) and colon cancer (PGF); FHx negative for diabetes        Review of Systems   Constitutional:  Negative for fatigue and fever.   HENT:  Negative for congestion, nosebleeds and sore throat.    Eyes:         Negative blurred vision or diplopia   Respiratory:  Negative for cough and shortness of breath.    Cardiovascular:  Negative for chest pain, palpitations and leg swelling.   Gastrointestinal:  Negative for abdominal pain, blood in stool, nausea and vomiting.        Negative melena or indigestion

## 2025-02-02 SDOH — HEALTH STABILITY: PHYSICAL HEALTH: ON AVERAGE, HOW MANY MINUTES DO YOU ENGAGE IN EXERCISE AT THIS LEVEL?: 20 MIN

## 2025-02-05 ENCOUNTER — OFFICE VISIT (OUTPATIENT)
Dept: PRIMARY CARE CLINIC | Age: 75
End: 2025-02-05

## 2025-02-05 VITALS
HEART RATE: 68 BPM | WEIGHT: 227.2 LBS | TEMPERATURE: 97.2 F | OXYGEN SATURATION: 98 % | DIASTOLIC BLOOD PRESSURE: 60 MMHG | RESPIRATION RATE: 16 BRPM | BODY MASS INDEX: 35.66 KG/M2 | HEIGHT: 67 IN | SYSTOLIC BLOOD PRESSURE: 126 MMHG

## 2025-02-05 DIAGNOSIS — Z13.220 SCREENING FOR HYPERLIPIDEMIA: Primary | ICD-10-CM

## 2025-02-05 DIAGNOSIS — E66.01 MORBID (SEVERE) OBESITY DUE TO EXCESS CALORIES: ICD-10-CM

## 2025-02-05 DIAGNOSIS — I48.0 PAF (PAROXYSMAL ATRIAL FIBRILLATION) (HCC): ICD-10-CM

## 2025-02-05 DIAGNOSIS — M25.551 PAIN OF RIGHT HIP: ICD-10-CM

## 2025-02-05 DIAGNOSIS — I10 PRIMARY HYPERTENSION: ICD-10-CM

## 2025-02-05 DIAGNOSIS — G30.9 ALZHEIMER'S DISEASE, UNSPECIFIED (CODE) (HCC): ICD-10-CM

## 2025-02-05 SDOH — ECONOMIC STABILITY: FOOD INSECURITY: WITHIN THE PAST 12 MONTHS, YOU WORRIED THAT YOUR FOOD WOULD RUN OUT BEFORE YOU GOT MONEY TO BUY MORE.: NEVER TRUE

## 2025-02-05 SDOH — ECONOMIC STABILITY: FOOD INSECURITY: WITHIN THE PAST 12 MONTHS, THE FOOD YOU BOUGHT JUST DIDN'T LAST AND YOU DIDN'T HAVE MONEY TO GET MORE.: NEVER TRUE

## 2025-02-05 ASSESSMENT — PATIENT HEALTH QUESTIONNAIRE - PHQ9
SUM OF ALL RESPONSES TO PHQ QUESTIONS 1-9: 0
SUM OF ALL RESPONSES TO PHQ QUESTIONS 1-9: 0
1. LITTLE INTEREST OR PLEASURE IN DOING THINGS: NOT AT ALL
SUM OF ALL RESPONSES TO PHQ QUESTIONS 1-9: 0
SUM OF ALL RESPONSES TO PHQ QUESTIONS 1-9: 0
2. FEELING DOWN, DEPRESSED OR HOPELESS: NOT AT ALL
SUM OF ALL RESPONSES TO PHQ9 QUESTIONS 1 & 2: 0

## 2025-02-05 NOTE — PROGRESS NOTES
2025     Jef Baker (:  1950) is a 74 y.o. male, here for evaluation of the following medical concerns:    HPI  Patient presented for follow-up concerning his multiple medical conditions as well as establishing care with a primary care provider.  Overall he is feeling well today and is needing labs obtained.      Patient has a past medical history significant for HTN and CAD s/p stent  and is followed by Cardiology and is on diltiazem, metoprolol, lipitor and Baby ASA.  Pt had recent normal bloodwork [lipid panel, CBC, BMP, hepatic function panel] 2024  Pt did have a recent unremarkable 2D Echo --- Pt did see Cardiology and was felt to be stable with no medication changes     Pt has hx of skin cancer about 15 years ago     Pt has a hx of nephrolithiasis most recent episode about 9 years ago     Pt has hx of sleep apnea and is on CPAP machine     Pt reports a normal colonoscopy about 5 years ago     Pt has a hx of Alzheimer's dementia and has been on aricept medication over the past 2 years and is followed by Neurology and had recent office visit and was felt to be stable and had CT scan head done which showed age-related atrophy with periventricular white matter changes bilaterally consistent with chronic small vessel ischemia.      Pt denies any hx of diabetes or asthma     Surgical hx includes L hip and B/L knee replacements and B/L cochlear implants and cholecystectomy. Right hip      Pt is a nonsmoker and drinks about 3 beers per week and denies any illegal drug use     FHx positive for prostate cancer (several uncles) and colon cancer (PGF); FHx negative for diabetes    Right pain of hip-no injury ongoing issue recently had his left hip replaced now is feeling pain in the right hip.    Today he denied chest pain, shortness of breath, nausea, vomiting, diarrhea.           Review of Systems   Constitutional:  Negative for activity change, fatigue, fever and unexpected weight change.

## 2025-02-07 ENCOUNTER — HOSPITAL ENCOUNTER (OUTPATIENT)
Age: 75
Discharge: HOME | End: 2025-02-07
Payer: MEDICARE

## 2025-02-07 DIAGNOSIS — Z13.220 SCREENING FOR HYPERLIPIDEMIA: Primary | ICD-10-CM

## 2025-02-07 DIAGNOSIS — I10 PRIMARY HYPERTENSION: ICD-10-CM

## 2025-02-07 DIAGNOSIS — I48.0 PAF (PAROXYSMAL ATRIAL FIBRILLATION) (HCC): ICD-10-CM

## 2025-02-07 DIAGNOSIS — G30.9 ALZHEIMER'S DISEASE, UNSPECIFIED (CODE) (HCC): ICD-10-CM

## 2025-02-07 DIAGNOSIS — M25.551 PAIN OF RIGHT HIP: ICD-10-CM

## 2025-02-07 LAB
ALBUMIN SERPL-MCNC: 4.2 G/DL (ref 3.4–5)
ALBUMIN/GLOB SERPL: 1.8 {RATIO} (ref 1.1–2.2)
ALP SERPL-CCNC: 82 U/L (ref 40–129)
ALT SERPL-CCNC: 26 U/L (ref 10–40)
ANION GAP SERPL CALCULATED.3IONS-SCNC: 8 MMOL/L (ref 3–16)
AST SERPL-CCNC: 25 U/L (ref 15–37)
BASOPHILS # BLD: 0.1 K/UL (ref 0–0.2)
BASOPHILS NFR BLD: 0.9 %
BILIRUB SERPL-MCNC: 1.5 MG/DL (ref 0–1)
BUN SERPL-MCNC: 16 MG/DL (ref 7–20)
CALCIUM SERPL-MCNC: 9.1 MG/DL (ref 8.3–10.6)
CHLORIDE SERPL-SCNC: 106 MMOL/L (ref 99–110)
CHOLEST SERPL-MCNC: 130 MG/DL (ref 0–199)
CO2 SERPL-SCNC: 27 MMOL/L (ref 21–32)
CREAT SERPL-MCNC: 1.3 MG/DL (ref 0.8–1.3)
DEPRECATED RDW RBC AUTO: 13.8 % (ref 12.4–15.4)
EOSINOPHIL # BLD: 0.3 K/UL (ref 0–0.6)
EOSINOPHIL NFR BLD: 4.1 %
GFR SERPLBLD CREATININE-BSD FMLA CKD-EPI: 57 ML/MIN/{1.73_M2}
GLUCOSE SERPL-MCNC: 99 MG/DL (ref 70–99)
HCT VFR BLD AUTO: 47.4 % (ref 40.5–52.5)
HDLC SERPL-MCNC: 59 MG/DL (ref 40–60)
HGB BLD-MCNC: 16.1 G/DL (ref 13.5–17.5)
LDLC SERPL CALC-MCNC: 60 MG/DL
LYMPHOCYTES # BLD: 1.4 K/UL (ref 1–5.1)
LYMPHOCYTES NFR BLD: 23.1 %
MCH RBC QN AUTO: 30.2 PG (ref 26–34)
MCHC RBC AUTO-ENTMCNC: 33.9 G/DL (ref 31–36)
MCV RBC AUTO: 89.1 FL (ref 80–100)
MONOCYTES # BLD: 0.5 K/UL (ref 0–1.3)
MONOCYTES NFR BLD: 7.8 %
NEUTROPHILS # BLD: 3.9 K/UL (ref 1.7–7.7)
NEUTROPHILS NFR BLD: 64.1 %
PLATELET # BLD AUTO: 119 K/UL (ref 135–450)
PMV BLD AUTO: 9.3 FL (ref 5–10.5)
POTASSIUM SERPL-SCNC: 4.2 MMOL/L (ref 3.5–5.1)
PROT SERPL-MCNC: 6.5 G/DL (ref 6.4–8.2)
RBC # BLD AUTO: 5.32 M/UL (ref 4.2–5.9)
SODIUM SERPL-SCNC: 141 MMOL/L (ref 136–145)
TRIGL SERPL-MCNC: 57 MG/DL (ref 0–150)
VLDLC SERPL CALC-MCNC: 11 MG/DL
WBC # BLD AUTO: 6.1 K/UL (ref 4–11)

## 2025-02-07 PROCEDURE — 73501 X-RAY EXAM HIP UNI 1 VIEW: CPT

## 2025-02-20 ENCOUNTER — TELEPHONE (OUTPATIENT)
Dept: PRIMARY CARE CLINIC | Age: 75
End: 2025-02-20

## 2025-02-20 NOTE — TELEPHONE ENCOUNTER
[employer] is calling. The Cologuard order cannot be processed because the ICD-10 did not pass insurance    [id number]

## 2025-02-27 NOTE — TELEPHONE ENCOUNTER
Pt's wife received a letter from [employer] in regards to this matter. Please change the ICD-10 code so he can have the Cologuard test done. They won't send the kit to him until this has been done.     [phone]

## 2025-02-28 NOTE — TELEPHONE ENCOUNTER
Patient kit will be arriving in 3-7 business days.No further action is needed for this encounter.

## 2025-03-09 LAB — COLOGUARD TEST, EXTERNAL: NEGATIVE

## 2025-03-11 DIAGNOSIS — Z13.220 SCREENING FOR HYPERLIPIDEMIA: ICD-10-CM

## 2025-03-11 DIAGNOSIS — I10 PRIMARY HYPERTENSION: ICD-10-CM

## 2025-03-11 DIAGNOSIS — I48.0 PAF (PAROXYSMAL ATRIAL FIBRILLATION) (HCC): ICD-10-CM

## 2025-03-12 ENCOUNTER — RESULTS FOLLOW-UP (OUTPATIENT)
Dept: PRIMARY CARE CLINIC | Age: 75
End: 2025-03-12

## 2025-03-12 LAB — PSA SERPL DL<=0.01 NG/ML-MCNC: 0.25 NG/ML (ref 0–4)

## 2025-03-15 ENCOUNTER — RESULTS FOLLOW-UP (OUTPATIENT)
Dept: PRIMARY CARE CLINIC | Age: 75
End: 2025-03-15

## 2025-03-15 LAB — NONINV COLON CA DNA+OCC BLD SCRN STL QL: NEGATIVE

## 2025-03-24 RX ORDER — METOPROLOL SUCCINATE 25 MG/1
25 TABLET, EXTENDED RELEASE ORAL DAILY
Qty: 90 TABLET | Refills: 3 | OUTPATIENT
Start: 2025-03-24

## 2025-03-27 ENCOUNTER — TELEPHONE (OUTPATIENT)
Dept: CARDIOLOGY CLINIC | Age: 75
End: 2025-03-27

## 2025-03-27 RX ORDER — METOPROLOL SUCCINATE 25 MG/1
25 TABLET, EXTENDED RELEASE ORAL DAILY
Qty: 90 TABLET | Refills: 3 | Status: SHIPPED | OUTPATIENT
Start: 2025-03-27

## 2025-03-27 RX ORDER — ATORVASTATIN CALCIUM 40 MG/1
40 TABLET, FILM COATED ORAL NIGHTLY
Qty: 90 TABLET | Refills: 3 | Status: SHIPPED | OUTPATIENT
Start: 2025-03-27

## 2025-03-27 RX ORDER — METOPROLOL SUCCINATE 25 MG/1
25 TABLET, EXTENDED RELEASE ORAL DAILY
Qty: 90 TABLET | Refills: 3 | Status: CANCELLED | OUTPATIENT
Start: 2025-03-27

## 2025-03-27 RX ORDER — ATORVASTATIN CALCIUM 40 MG/1
40 TABLET, FILM COATED ORAL NIGHTLY
Qty: 90 TABLET | Refills: 3 | Status: CANCELLED | OUTPATIENT
Start: 2025-03-27

## 2025-03-27 NOTE — TELEPHONE ENCOUNTER
Medication Refill    Medication needing refilled:  atorvastatin (LIPITOR) 40 MG 1 tablet by mouth every evening   metoprolol succinate (TOPROL XL) 25 MG-1 tablet by mouth once daily      Dosage of the medication:    How are you taking this medication (QD, BID, TID, QID, PRN):    30 or 90 day supply called in: 90 day supply    When will you run out of your medication:    Which Pharmacy are we sending the medication to?:[facility] [id number] - [place], [place] - [address] [place] [address] [address] - P [phone] - F [phone]

## 2025-04-04 RX ORDER — DILTIAZEM HYDROCHLORIDE 120 MG/1
120 CAPSULE, COATED, EXTENDED RELEASE ORAL DAILY
Qty: 90 CAPSULE | Refills: 3 | Status: SHIPPED | OUTPATIENT
Start: 2025-04-04

## 2025-04-04 NOTE — TELEPHONE ENCOUNTER
Received refill request for Cartia XT  from [facility].     Last OV: 5/7/2024 [facility]    Next OV: 5/13/2025 [facility]    Last Labs: 5/7/2024 EKG

## 2025-04-04 NOTE — TELEPHONE ENCOUNTER
Medication Refill    Medication needing refilled:  CARTIA XT      Dosage of the medication: 120mg    How are you taking this medication (QD, BID, TID, QID, PRN):   Take 1 capsule by mouth once daily     30 or 90 day supply called in: 90    When will you run out of your medication:    Which Pharmacy are we sending the medication to?:     [facility] [id number]   [address] [id number]   Phone:   [phone]  Fax:   [phone]

## 2025-05-05 PROBLEM — I47.19 ATRIAL TACHYCARDIA: Status: ACTIVE | Noted: 2025-05-05

## 2025-05-05 NOTE — PROGRESS NOTES
[facility]      Cardiology Follow Up  [phone]  5/13/25  Referring: Preet Garcia DO (PCP)    REASON FOR CONSULT/CHIEF COMPLAINT/HPI     Reason for visit/ Chief complaint  CAD s/p stent  HTN     HPI Jef Baker is a 74 y.o.male patient here for follow up.     Patient has a past medical history significant for HTN and CAD s/p stent 2014 ([facility] x 9yrs), skin cancer about 15 years ago , FABY with CPAP,  Alzheimer's dementia and has been on aricept medication over the past 2 years and is followed by Neurology.     Pt reports a normal colonoscopy about 6 years ago     Pt is a nonsmoker and drinks about 3 beers per week and denies any illegal drug use. He has BL cochlear implants.     Today he is here with his wife. He denies any CP, SOB, palpitations or dizziness at this time.     Patient is adherent with medications and is tolerating them well without side effects     HISTORY/ALLERGIES/ROS     MedHx:  has a past medical history of Angina pectoris, unspecified, Cancer ([facility]), Colon polyps, Coronary artery disease, H/O colonoscopy, Heart disease, Hyperlipidemia, Kidney stone, Loss of hearing, FABY (obstructive sleep apnea), Osteoarthritis, and PAF (paroxysmal atrial fibrillation) ([facility]).  SurgHx:  has a past surgical history that includes Knee arthroscopy (1998; 4/04); Cholecystectomy, laparoscopic (03/2011); Rotator cuff repair (2004); Total knee arthroplasty; Carpal tunnel release; Skin cancer excision; Colonoscopy (01/2019); joint replacement; skin biopsy; Coronary angioplasty with stent (2016  may); Cochlear implant (Right, 08/2017); Total hip arthroplasty (Left, 03/2023); Diagnostic Cardiac Cath Lab Procedure (2016); Cardiac catheterization (2016); Hand surgery; and hip surgery (2023).   SocHx:  reports that he has never smoked. He has never been exposed to tobacco smoke. He has never used smokeless tobacco. He reports current alcohol use of about 3.0 standard drinks of alcohol per week.

## 2025-05-09 ENCOUNTER — OFFICE VISIT (OUTPATIENT)
Dept: ORTHOPEDIC SURGERY | Age: 75
End: 2025-05-09

## 2025-05-09 VITALS — WEIGHT: 227 LBS | HEIGHT: 67 IN | BODY MASS INDEX: 35.63 KG/M2

## 2025-05-09 DIAGNOSIS — M16.11 PRIMARY OSTEOARTHRITIS OF RIGHT HIP: ICD-10-CM

## 2025-05-09 DIAGNOSIS — R52 PAIN: Primary | ICD-10-CM

## 2025-05-09 DIAGNOSIS — S70.01XA CONTUSION OF RIGHT HIP, INITIAL ENCOUNTER: ICD-10-CM

## 2025-05-09 NOTE — PROGRESS NOTES
Dr Noel Duran      Date /Time 5/9/2025       10:26 AM EDT  Name Jef Baker             1950   Location  [facility]  MRN [id number]                Chief Complaint   Patient presents with    New Patient     N Right Hip          History of Present Illness    Jef Baker is a 74 y.o. male who presents with  right hip pain.    Sent in consultation by Preet Richard DO, .    Injury Mechanism:  none.  Worker's Comp. & legal issues:   none.  Previous Treatments: Ice, Heat, and NSAIDs    Patient presents to the office today for new problem.  Patient here with a chief complaint of right hip pain.  Patient's right hip became painful when he tripped and fell twice recently.  He continues to complain of significant pain, swelling, and ecchymosis involving the right hip.  He has had a previous left total hip arthroplasty.  Left hip is asymptomatic    Past History  Past Medical History:   Diagnosis Date    Angina pectoris, unspecified 09/28/2022    Cancer (HCC)     skin    Colon polyps 2013      sheik    due  2018    Coronary artery disease 2016    stent  to lad  and  70% circ    H/O colonoscopy 2003    negative    Hyperlipidemia     Kidney stone 1994    Loss of hearing     FABY (obstructive sleep apnea)     Osteoarthritis     PAF (paroxysmal atrial fibrillation) (HCC)      Past Surgical History:   Procedure Laterality Date    CARPAL TUNNEL RELEASE      left     CHOLECYSTECTOMY, LAPAROSCOPIC  03/2011    COCHLEAR IMPLANT Right 08/2017    at [facility]  dr sylvester    COLONOSCOPY  01/2019    [facility]   colon polyps  due  2024    CORONARY ANGIOPLASTY WITH STENT PLACEMENT  2016  may    stent  Lda  steph    DIAGNOSTIC CARDIAC CATH LAB PROCEDURE  2016    JOINT REPLACEMENT      bilateral knees    KNEE ARTHROSCOPY  1998; 4/04    left:  Right 9/04    ROTATOR CUFF REPAIR  2004    right/ left     SKIN BIOPSY      SKIN CANCER EXCISION      left AC    TOTAL HIP ARTHROPLASTY Left 03/2023    harley    TOTAL KNEE

## 2025-05-13 ENCOUNTER — OFFICE VISIT (OUTPATIENT)
Dept: CARDIOLOGY CLINIC | Age: 75
End: 2025-05-13
Payer: MEDICARE

## 2025-05-13 VITALS
WEIGHT: 217.6 LBS | OXYGEN SATURATION: 96 % | DIASTOLIC BLOOD PRESSURE: 80 MMHG | HEIGHT: 67 IN | HEART RATE: 68 BPM | SYSTOLIC BLOOD PRESSURE: 112 MMHG | BODY MASS INDEX: 34.15 KG/M2

## 2025-05-13 DIAGNOSIS — E78.2 MIXED HYPERLIPIDEMIA: ICD-10-CM

## 2025-05-13 DIAGNOSIS — I25.10 CORONARY ARTERY DISEASE INVOLVING NATIVE CORONARY ARTERY OF NATIVE HEART WITHOUT ANGINA PECTORIS: Primary | ICD-10-CM

## 2025-05-13 DIAGNOSIS — G47.33 OSA ON CPAP: ICD-10-CM

## 2025-05-13 DIAGNOSIS — G30.9 ALZHEIMER'S DISEASE, UNSPECIFIED (CODE) (HCC): ICD-10-CM

## 2025-05-13 DIAGNOSIS — I10 PRIMARY HYPERTENSION: ICD-10-CM

## 2025-05-13 DIAGNOSIS — I47.19 ATRIAL TACHYCARDIA: ICD-10-CM

## 2025-05-13 PROCEDURE — G2211 COMPLEX E/M VISIT ADD ON: HCPCS | Performed by: INTERNAL MEDICINE

## 2025-05-13 PROCEDURE — 1159F MED LIST DOCD IN RCRD: CPT | Performed by: INTERNAL MEDICINE

## 2025-05-13 PROCEDURE — 3079F DIAST BP 80-89 MM HG: CPT | Performed by: INTERNAL MEDICINE

## 2025-05-13 PROCEDURE — 93000 ELECTROCARDIOGRAM COMPLETE: CPT | Performed by: INTERNAL MEDICINE

## 2025-05-13 PROCEDURE — 99214 OFFICE O/P EST MOD 30 MIN: CPT | Performed by: INTERNAL MEDICINE

## 2025-05-13 PROCEDURE — 3074F SYST BP LT 130 MM HG: CPT | Performed by: INTERNAL MEDICINE

## 2025-05-13 PROCEDURE — 1123F ACP DISCUSS/DSCN MKR DOCD: CPT | Performed by: INTERNAL MEDICINE

## 2025-08-02 SDOH — HEALTH STABILITY: PHYSICAL HEALTH: ON AVERAGE, HOW MANY DAYS PER WEEK DO YOU ENGAGE IN MODERATE TO STRENUOUS EXERCISE (LIKE A BRISK WALK)?: 1 DAY

## 2025-08-02 ASSESSMENT — LIFESTYLE VARIABLES
HOW OFTEN DO YOU HAVE A DRINK CONTAINING ALCOHOL: 4
HOW OFTEN DO YOU HAVE SIX OR MORE DRINKS ON ONE OCCASION: 1
HOW MANY STANDARD DRINKS CONTAINING ALCOHOL DO YOU HAVE ON A TYPICAL DAY: 1
HOW OFTEN DO YOU HAVE A DRINK CONTAINING ALCOHOL: 2-3 TIMES A WEEK
HOW MANY STANDARD DRINKS CONTAINING ALCOHOL DO YOU HAVE ON A TYPICAL DAY: 1 OR 2

## 2025-08-02 ASSESSMENT — PATIENT HEALTH QUESTIONNAIRE - PHQ9
2. FEELING DOWN, DEPRESSED OR HOPELESS: NOT AT ALL
SUM OF ALL RESPONSES TO PHQ QUESTIONS 1-9: 0
SUM OF ALL RESPONSES TO PHQ QUESTIONS 1-9: 0
1. LITTLE INTEREST OR PLEASURE IN DOING THINGS: NOT AT ALL
SUM OF ALL RESPONSES TO PHQ QUESTIONS 1-9: 0
SUM OF ALL RESPONSES TO PHQ QUESTIONS 1-9: 0

## 2025-08-08 ENCOUNTER — OFFICE VISIT (OUTPATIENT)
Dept: PRIMARY CARE CLINIC | Age: 75
End: 2025-08-08

## 2025-08-08 VITALS
WEIGHT: 213.2 LBS | RESPIRATION RATE: 16 BRPM | HEART RATE: 61 BPM | DIASTOLIC BLOOD PRESSURE: 68 MMHG | SYSTOLIC BLOOD PRESSURE: 116 MMHG | TEMPERATURE: 96.6 F | BODY MASS INDEX: 33.46 KG/M2 | OXYGEN SATURATION: 98 % | HEIGHT: 67 IN

## 2025-08-08 DIAGNOSIS — Z00.00 MEDICARE ANNUAL WELLNESS VISIT, SUBSEQUENT: Primary | ICD-10-CM

## 2025-08-08 RX ORDER — MEMANTINE HYDROCHLORIDE 10 MG/1
TABLET ORAL
COMMUNITY
Start: 2025-07-31

## 2025-08-19 ENCOUNTER — OFFICE VISIT (OUTPATIENT)
Dept: ORTHOPEDIC SURGERY | Age: 75
End: 2025-08-19
Payer: MEDICARE

## 2025-08-19 VITALS — WEIGHT: 213 LBS | HEIGHT: 67 IN | BODY MASS INDEX: 33.43 KG/M2

## 2025-08-19 DIAGNOSIS — Z01.818 PRE-OP TESTING: ICD-10-CM

## 2025-08-19 DIAGNOSIS — R52 PAIN: ICD-10-CM

## 2025-08-19 DIAGNOSIS — M25.551 PAIN OF RIGHT HIP JOINT: ICD-10-CM

## 2025-08-19 DIAGNOSIS — M79.604 PAIN OF RIGHT LOWER EXTREMITY: ICD-10-CM

## 2025-08-19 DIAGNOSIS — Z13.1 SCREENING FOR DIABETES MELLITUS: ICD-10-CM

## 2025-08-19 DIAGNOSIS — M16.11 PRIMARY OSTEOARTHRITIS OF RIGHT HIP: Primary | ICD-10-CM

## 2025-08-19 PROCEDURE — 99215 OFFICE O/P EST HI 40 MIN: CPT | Performed by: ORTHOPAEDIC SURGERY

## 2025-08-19 PROCEDURE — 1159F MED LIST DOCD IN RCRD: CPT | Performed by: ORTHOPAEDIC SURGERY

## 2025-08-19 PROCEDURE — 1123F ACP DISCUSS/DSCN MKR DOCD: CPT | Performed by: ORTHOPAEDIC SURGERY

## 2025-08-19 RX ORDER — MUPIROCIN 2 %
OINTMENT (GRAM) TOPICAL
Qty: 1 G | Refills: 0 | Status: SHIPPED | OUTPATIENT
Start: 2025-08-19